# Patient Record
Sex: FEMALE | Race: BLACK OR AFRICAN AMERICAN | NOT HISPANIC OR LATINO
[De-identification: names, ages, dates, MRNs, and addresses within clinical notes are randomized per-mention and may not be internally consistent; named-entity substitution may affect disease eponyms.]

---

## 2020-11-14 ENCOUNTER — TRANSCRIPTION ENCOUNTER (OUTPATIENT)
Age: 35
End: 2020-11-14

## 2022-02-13 ENCOUNTER — FORM ENCOUNTER (OUTPATIENT)
Age: 37
End: 2022-02-13

## 2022-02-21 ENCOUNTER — FORM ENCOUNTER (OUTPATIENT)
Age: 37
End: 2022-02-21

## 2022-10-28 ENCOUNTER — NON-APPOINTMENT (OUTPATIENT)
Age: 37
End: 2022-10-28

## 2022-12-17 ENCOUNTER — OUTPATIENT (OUTPATIENT)
Dept: OUTPATIENT SERVICES | Facility: HOSPITAL | Age: 37
LOS: 1 days | End: 2022-12-17
Payer: COMMERCIAL

## 2022-12-17 ENCOUNTER — RESULT REVIEW (OUTPATIENT)
Age: 37
End: 2022-12-17

## 2022-12-17 ENCOUNTER — APPOINTMENT (OUTPATIENT)
Dept: ULTRASOUND IMAGING | Facility: IMAGING CENTER | Age: 37
End: 2022-12-17

## 2022-12-17 ENCOUNTER — APPOINTMENT (OUTPATIENT)
Dept: MAMMOGRAPHY | Facility: IMAGING CENTER | Age: 37
End: 2022-12-17

## 2022-12-17 ENCOUNTER — APPOINTMENT (OUTPATIENT)
Dept: OBGYN | Facility: HOSPITAL | Age: 37
End: 2022-12-17

## 2022-12-17 ENCOUNTER — OUTPATIENT (OUTPATIENT)
Dept: OUTPATIENT SERVICES | Facility: HOSPITAL | Age: 37
LOS: 1 days | End: 2022-12-17

## 2022-12-17 DIAGNOSIS — Z00.8 ENCOUNTER FOR OTHER GENERAL EXAMINATION: ICD-10-CM

## 2022-12-17 DIAGNOSIS — Z12.39 ENCOUNTER FOR OTHER SCREENING FOR MALIGNANT NEOPLASM OF BREAST: ICD-10-CM

## 2022-12-17 PROCEDURE — 76641 ULTRASOUND BREAST COMPLETE: CPT | Mod: 26,50

## 2022-12-17 PROCEDURE — 77066 DX MAMMO INCL CAD BI: CPT | Mod: 26

## 2022-12-17 PROCEDURE — G0279: CPT | Mod: 26

## 2022-12-17 PROCEDURE — 77066 DX MAMMO INCL CAD BI: CPT

## 2022-12-17 PROCEDURE — 76641 ULTRASOUND BREAST COMPLETE: CPT

## 2022-12-17 PROCEDURE — G0279: CPT

## 2022-12-28 ENCOUNTER — APPOINTMENT (OUTPATIENT)
Dept: ENDOCRINOLOGY | Facility: CLINIC | Age: 37
End: 2022-12-28
Payer: SELF-PAY

## 2022-12-28 VITALS
SYSTOLIC BLOOD PRESSURE: 110 MMHG | WEIGHT: 158 LBS | OXYGEN SATURATION: 99 % | DIASTOLIC BLOOD PRESSURE: 74 MMHG | HEIGHT: 65 IN | BODY MASS INDEX: 26.33 KG/M2 | HEART RATE: 122 BPM

## 2022-12-28 PROCEDURE — 99204 OFFICE O/P NEW MOD 45 MIN: CPT

## 2023-01-02 NOTE — PHYSICAL EXAM
[Alert] : alert [Healthy Appearance] : healthy appearance [No Acute Distress] : no acute distress [Well Developed] : well developed [Normal Sclera/Conjunctiva] : normal sclera/conjunctiva [EOMI] : extra ocular movement intact [Visual Fields Grossly Intact] : visual fields grossly intact [No Neck Mass] : no neck mass was observed [Thyroid Not Enlarged] : the thyroid was not enlarged [No Respiratory Distress] : no respiratory distress [No Accessory Muscle Use] : no accessory muscle use [Clear to Auscultation] : lungs were clear to auscultation bilaterally [Normal S1, S2] : normal S1 and S2 [Normal Rate] : heart rate was normal [Regular Rhythm] : with a regular rhythm [No Edema] : no peripheral edema [Not Tender] : non-tender [Not Distended] : not distended [Soft] : abdomen soft [No Involuntary Movements] : no involuntary movements were seen [No Tremors] : no tremors [Oriented x3] : oriented to person, place, and time [Normal Affect] : the affect was normal [Abdominal Striae] : no abdominal striae [de-identified] : right breast + cloudy discharge from nipple

## 2023-01-02 NOTE — HISTORY OF PRESENT ILLNESS
[FreeTextEntry1] : HISTORY OF PRESENTING ILLNESS:\par The patient is a 37 year old female being seen in the office today for galactorrhea and irregular menses. Patient denies medical problems and denies taking any current medications or supplements. \par \par CHIEF COMPLAINT: galactorrhea and irregular menses\par \par REFERRED BY: her employer referred her to endocrine clinic \par \par Patient reports having irregular menses for 2.5 years. Reports having a few months were she does not have menstrual cycle. Reports spotting in between menstrual cycle.  Last menstrual cycle 11/22/2022 and prior to that was 10/4/2022. But has been having occasional spotting. OBGYN: Dr. Dillon Shahid MD. 2/2022 Pap-smear which was normal per patient. Reports having nipple discharge for one year. Reports cloudy discharge from nipple upon palpation of breast. New pain on right side of breast for about one month. Saw another GYN and had breast US & mammogram done 12/14/2022, does not have results yet. Had blood work done with Dr. Shahid 10/2022 which showed elevated prolactin level, believes prolactin level was 128 at that time. Denies headaches, dizziness, visual field changes, increased facial hair or acne. \par \par \par \par

## 2023-01-02 NOTE — REVIEW OF SYSTEMS
[Irregular Menses] : irregular menses [Galactorrhea] : galactorrhea  [Recent Weight Gain (___ Lbs)] : no recent weight gain [Recent Weight Loss (___ Lbs)] : no recent weight loss [Visual Field Defect] : no visual field defect [Eye Pain] : no pain [Blurred Vision] : no blurred vision [Dysphagia] : no dysphagia [Neck Pain] : no neck pain [Dysphonia] : no dysphonia [Chest Pain] : no chest pain [Palpitations] : no palpitations [Shortness Of Breath] : no shortness of breath [Nausea] : no nausea [Constipation] : no constipation [Vomiting] : no vomiting [Diarrhea] : no diarrhea [Polyuria] : no polyuria [Acne] : no acne [Dry Skin] : no dry skin [Hirsutism] : no hirsutism [Hair Loss] : no hair loss [Headaches] : no headaches [Dizziness] : no dizziness [Difficulty Walking] : no difficulty walking [Tremors] : no tremors [Depression] : no depression [Polydipsia] : no polydipsia [Cold Intolerance] : no cold intolerance [Heat Intolerance] : no heat intolerance [Hot Flashes] : no hot flashes

## 2023-01-02 NOTE — ASSESSMENT
[FreeTextEntry1] : 37 year old female being seen in the office today for galactorrhea and irregular menses.\par - reports having irregular menses for 2.5 years. Reports having nipple discharge for one year. \par - Had blood work done with Dr. Shahid 10/2022 which showed elevated prolactin level, believes prolactin level was 128 at that time.\par - Will work up for pituitary adenoma. However, patient does not have insurance and is paying out of pocket for all appointments/testing. Will avoid unnecessary testing at this time given lack on insurance/cost. \par - Recommend MRI pituitary w/ and w/o contrast.  to provide patient with info for [facility] imaging center billing department.  \par - Likely will need to start Cabergoline pending MRI results. \par \par \par RTC in 3 months \par \par

## 2023-01-06 ENCOUNTER — NON-APPOINTMENT (OUTPATIENT)
Age: 38
End: 2023-01-06

## 2023-01-07 ENCOUNTER — OUTPATIENT (OUTPATIENT)
Dept: OUTPATIENT SERVICES | Facility: HOSPITAL | Age: 38
LOS: 1 days | End: 2023-01-07
Payer: COMMERCIAL

## 2023-01-07 ENCOUNTER — APPOINTMENT (OUTPATIENT)
Dept: MRI IMAGING | Facility: IMAGING CENTER | Age: 38
End: 2023-01-07

## 2023-01-07 DIAGNOSIS — N64.3 GALACTORRHEA NOT ASSOCIATED WITH CHILDBIRTH: ICD-10-CM

## 2023-01-07 DIAGNOSIS — Z91.89 OTHER SPECIFIED PERSONAL RISK FACTORS, NOT ELSEWHERE CLASSIFIED: ICD-10-CM

## 2023-01-07 DIAGNOSIS — Z00.8 ENCOUNTER FOR OTHER GENERAL EXAMINATION: ICD-10-CM

## 2023-01-07 DIAGNOSIS — Z01.419 ENCOUNTER FOR GYNECOLOGICAL EXAMINATION (GENERAL) (ROUTINE) WITHOUT ABNORMAL FINDINGS: ICD-10-CM

## 2023-01-07 PROCEDURE — A9585: CPT

## 2023-01-07 PROCEDURE — 70553 MRI BRAIN STEM W/O & W/DYE: CPT | Mod: 26

## 2023-01-07 PROCEDURE — 70553 MRI BRAIN STEM W/O & W/DYE: CPT

## 2023-01-13 ENCOUNTER — NON-APPOINTMENT (OUTPATIENT)
Age: 38
End: 2023-01-13

## 2023-02-02 ENCOUNTER — TRANSCRIPTION ENCOUNTER (OUTPATIENT)
Age: 38
End: 2023-02-02

## 2023-02-06 ENCOUNTER — NON-APPOINTMENT (OUTPATIENT)
Age: 38
End: 2023-02-06

## 2023-02-08 ENCOUNTER — APPOINTMENT (OUTPATIENT)
Dept: SURGICAL ONCOLOGY | Facility: CLINIC | Age: 38
End: 2023-02-08
Payer: COMMERCIAL

## 2023-02-16 ENCOUNTER — OUTPATIENT (OUTPATIENT)
Dept: OUTPATIENT SERVICES | Facility: HOSPITAL | Age: 38
LOS: 1 days | End: 2023-02-16

## 2023-02-16 ENCOUNTER — APPOINTMENT (OUTPATIENT)
Dept: INTERNAL MEDICINE | Facility: CLINIC | Age: 38
End: 2023-02-16
Payer: COMMERCIAL

## 2023-02-16 VITALS
BODY MASS INDEX: 26.16 KG/M2 | WEIGHT: 157 LBS | HEART RATE: 91 BPM | DIASTOLIC BLOOD PRESSURE: 70 MMHG | HEIGHT: 65 IN | OXYGEN SATURATION: 99 % | SYSTOLIC BLOOD PRESSURE: 116 MMHG

## 2023-02-16 DIAGNOSIS — Z80.1 FAMILY HISTORY OF MALIGNANT NEOPLASM OF TRACHEA, BRONCHUS AND LUNG: ICD-10-CM

## 2023-02-16 DIAGNOSIS — Z78.9 OTHER SPECIFIED HEALTH STATUS: ICD-10-CM

## 2023-02-16 DIAGNOSIS — Z80.3 FAMILY HISTORY OF MALIGNANT NEOPLASM OF BREAST: ICD-10-CM

## 2023-02-16 DIAGNOSIS — M54.50 LOW BACK PAIN, UNSPECIFIED: ICD-10-CM

## 2023-02-16 PROCEDURE — ZZZZZ: CPT | Mod: GC

## 2023-02-16 NOTE — REVIEW OF SYSTEMS
[Abdominal Pain] : abdominal pain [Constipation] : constipation [Back Pain] : back pain [Negative] : Heme/Lymph [FreeTextEntry1] : Burning of R breast

## 2023-02-16 NOTE — PHYSICAL EXAM
[No Acute Distress] : no acute distress [Well Nourished] : well nourished [Well Developed] : well developed [Well-Appearing] : well-appearing [Normal Sclera/Conjunctiva] : normal sclera/conjunctiva [PERRL] : pupils equal round and reactive to light [EOMI] : extraocular movements intact [Normal Outer Ear/Nose] : the outer ears and nose were normal in appearance [Normal Oropharynx] : the oropharynx was normal [No JVD] : no jugular venous distention [No Lymphadenopathy] : no lymphadenopathy [Supple] : supple [Thyroid Normal, No Nodules] : the thyroid was normal and there were no nodules present [No Respiratory Distress] : no respiratory distress  [No Accessory Muscle Use] : no accessory muscle use [Clear to Auscultation] : lungs were clear to auscultation bilaterally [Normal Rate] : normal rate  [Regular Rhythm] : with a regular rhythm [Normal S1, S2] : normal S1 and S2 [No Murmur] : no murmur heard [No Carotid Bruits] : no carotid bruits [No Abdominal Bruit] : a ~M bruit was not heard ~T in the abdomen [No Varicosities] : no varicosities [Pedal Pulses Present] : the pedal pulses are present [No Edema] : there was no peripheral edema [No Palpable Aorta] : no palpable aorta [No Extremity Clubbing/Cyanosis] : no extremity clubbing/cyanosis [Normal Appearance] : normal in appearance [No Nipple Discharge] : no nipple discharge [Soft] : abdomen soft [Non-distended] : non-distended [No Masses] : no abdominal mass palpated [No HSM] : no HSM [Normal Bowel Sounds] : normal bowel sounds [Normal Posterior Cervical Nodes] : no posterior cervical lymphadenopathy [Normal Anterior Cervical Nodes] : no anterior cervical lymphadenopathy [No Spinal Tenderness] : no spinal tenderness [No Joint Swelling] : no joint swelling [Grossly Normal Strength/Tone] : grossly normal strength/tone [No Rash] : no rash [Coordination Grossly Intact] : coordination grossly intact [No Focal Deficits] : no focal deficits [Normal Gait] : normal gait [Deep Tendon Reflexes (DTR)] : deep tendon reflexes were 2+ and symmetric [Normal Affect] : the affect was normal [Normal Insight/Judgement] : insight and judgment were intact [de-identified] : Tenderness to palpation of RLQ [de-identified] : Mild R CVA tenderness and tenderness of mid and lower R back

## 2023-02-16 NOTE — PLAN
[FreeTextEntry1] : 37 year old F with PMHx of hyperprolactinemia (on cabergoline since 1/23) being seen in the office today for new patient visit and complaint of persistent R breast burning and R sided back and abdominal pain.\par \par 37 year old F with PMHx of hyperprolactinemia (on cabergoline since 1/23) being seen in the office today for new patient visit and complaint of persistent R breast burning and R sided back and abdominal pain.\par \par #Hyperprolactinemia\par Prolactin of 128.6 (10/22) identified by her gyn, saw endo in 12/28/22 for complaint of galactorrhea (1 year) and irregular menses (2.5 years), had negative MRI for adenoma. Negative mammogram and US of breasts (12/22). She has been on cabergoline 0.5 x2 a week since 1/23. Likely microadenoma, unidentified on MRI. Galactorrhea resolved, irregular menses/spotting much improved, persistent burning of R breast.\par -c/w cabergoline\par -will repeat prolactin today (will notify patient and she will make endo appt after)\par \par #Persistent burning of R breast\par Pt feels like there is something wrong with her R breast as it feels dense and continues to burn. The burning pain alternates between superiorly over the nipple and within the nipple. Mammogram and US negative 12/22.\par -reassured pt regarding normal imaging tests\par -pt will be seeing gyn on 3/2/23\par \par #Back and abdominal pain\par Pt is complaining of R sided back pain since 1/23, which radiates to her R hip. 7/10 pain, present most of the time. Two weeks ago, pt additionally developed pain of the R abdomen. She stated it is worse with urination. Mild tenderness to palpation of back. Back pain likely musculoskeletal rather than kidney stone. For abdominal pain, likely related to constipation. Negative Argueta's sign. No dysuria, fever or chills. \par -urinalysis w/ microscopic eval\par -encouraged to drink water\par -will investigate whether there is interaction with tylenol/NSAIDs with cabergoline\par -abdominal US\par -FIT test\par -encouraged to take metamucil to treat constipation (see below)\par -advised if back/abdominal pain worsens to call our clinic \par \par #Constipation\par Pt reports hx of constipation since she was a child but recently has gotten worse. In 12/23/22, she woke up to a loud growling noise of her stomach. Since then she has been going more frequently than she is used to, but the stool is small. She occasionally takes "smooth tea" for her constipation. Denied blood in stool.\par -FIT test\par -encouraged to take metamucil\par -encouraged to stay away from red meat and incorporate more vegetables into diet\par \par #HCM\par Vaccines:\par -COVID: x2, encouraged pt to get booster\par -Flu: pt would like to defer today\par -TDAP:  pt would like to defer today\par \par Screening:\par -PHQ2: negative\par -Lipids, A1c: check today\par \par Female Specific:\par -Last Pap Smear: 2/14/22, has gyn appt 3/2/23\par -Mammogram: 12/22- negative\par \par Kaylene Gliagias, PGY-1\par Case Discussed with Dr. Aguilar\par [facility]\par \par RTC in 5 weeks\par \par \par

## 2023-02-16 NOTE — HISTORY OF PRESENT ILLNESS
[FreeTextEntry1] : 37 year old F with PMHx of hyperprolactinemia (on cabergoline since 1/23) being seen in the office today for new patient visit and complaint of persistent R breast burning and R sided back and abdominal pain.\par \par  [de-identified] : 37 year old F with PMHx of hyperprolactinemia (on cabergoline since 1/23) being seen in the office today for new patient visit and complaint of persistent R breast burning and R sided back and abdominal pain.\par \par #Hyperprolactinemia: Pt found to have hyperprolactinemia (10/22) of 128.6, saw endo in 12/28/22 for complaint of galactorrhea (1 year) and irregular menses (2.5 years), had negative MRI for adenoma. Negative mammogram and US of breasts (12/22). She has been on cabergoline 0.5 x2 a week since 1/23.\par \par #Persistent burning of R breast: Pt stated that even though galactorrhea has resolved, she still feels persistent burning of her R breast. The pain which she initially felt since November, when all of her sx started, has resolved. However, she feels like there is something wrong with her R breast as it feels dense and continues to burn. The burning pain alternates between superiorly over the nipple and within the nipple.\par \par #Back and abdominal pain: Pt is complaining of R sided back pain since 1/23, which radiates to her R hip. The pain is 7/10 and is present most of the time. Two weeks ago, pt additionally developed pain of the R abdomen. She stated it is worse with urination. Pt has not taken any medication for the pain because she is worried there is an interaction with cabergoline.\par \par #Constipation: Pt also reports hx of constipation since she was a child but recently has gotten worse. In 12/23/22, she woke up to a loud growling noise of her stomach. Since then she has been going more frequently than she is used to, but the stool is small. She occasionally takes "smooth tea" for her constipation. Denied blood in stool.\par \par Denied fever, chills, dysuria, chest pain, SOB, leg swelling.

## 2023-02-17 ENCOUNTER — NON-APPOINTMENT (OUTPATIENT)
Age: 38
End: 2023-02-17

## 2023-02-17 DIAGNOSIS — K59.00 CONSTIPATION, UNSPECIFIED: ICD-10-CM

## 2023-02-17 DIAGNOSIS — E22.1 HYPERPROLACTINEMIA: ICD-10-CM

## 2023-02-17 LAB
ALBUMIN SERPL ELPH-MCNC: 4.3 G/DL
ALP BLD-CCNC: 47 U/L
ALT SERPL-CCNC: 10 U/L
ANION GAP SERPL CALC-SCNC: 12 MMOL/L
AST SERPL-CCNC: 14 U/L
BASOPHILS # BLD AUTO: 0.1 K/UL
BASOPHILS NFR BLD AUTO: 1.6 %
BILIRUB SERPL-MCNC: 0.5 MG/DL
BUN SERPL-MCNC: 6 MG/DL
CALCIUM SERPL-MCNC: 9.6 MG/DL
CHLORIDE SERPL-SCNC: 104 MMOL/L
CHOLEST SERPL-MCNC: 131 MG/DL
CO2 SERPL-SCNC: 23 MMOL/L
CREAT SERPL-MCNC: 0.62 MG/DL
EGFR: 118 ML/MIN/1.73M2
EOSINOPHIL # BLD AUTO: 0.34 K/UL
EOSINOPHIL NFR BLD AUTO: 5.3 %
GLUCOSE SERPL-MCNC: 82 MG/DL
HCT VFR BLD CALC: 39.2 %
HDLC SERPL-MCNC: 45 MG/DL
HGB BLD-MCNC: 12.1 G/DL
IMM GRANULOCYTES NFR BLD AUTO: 0 %
LDLC SERPL CALC-MCNC: 74 MG/DL
LYMPHOCYTES # BLD AUTO: 2.63 K/UL
LYMPHOCYTES NFR BLD AUTO: 41 %
MAN DIFF?: NORMAL
MCHC RBC-ENTMCNC: 26.4 PG
MCHC RBC-ENTMCNC: 30.9 GM/DL
MCV RBC AUTO: 85.4 FL
MONOCYTES # BLD AUTO: 0.62 K/UL
MONOCYTES NFR BLD AUTO: 9.7 %
NEUTROPHILS # BLD AUTO: 2.72 K/UL
NEUTROPHILS NFR BLD AUTO: 42.4 %
NONHDLC SERPL-MCNC: 87 MG/DL
PLATELET # BLD AUTO: 317 K/UL
POTASSIUM SERPL-SCNC: 4.6 MMOL/L
PROLACTIN SERPL-MCNC: 7.5 NG/ML
PROT SERPL-MCNC: 7.2 G/DL
RBC # BLD: 4.59 M/UL
RBC # FLD: 14.3 %
SODIUM SERPL-SCNC: 139 MMOL/L
TRIGL SERPL-MCNC: 62 MG/DL
WBC # FLD AUTO: 6.41 K/UL

## 2023-02-21 DIAGNOSIS — Z00.00 ENCOUNTER FOR GENERAL ADULT MEDICAL EXAMINATION WITHOUT ABNORMAL FINDINGS: ICD-10-CM

## 2023-03-08 ENCOUNTER — APPOINTMENT (OUTPATIENT)
Dept: SURGICAL ONCOLOGY | Facility: CLINIC | Age: 38
End: 2023-03-08
Payer: COMMERCIAL

## 2023-03-08 ENCOUNTER — NON-APPOINTMENT (OUTPATIENT)
Age: 38
End: 2023-03-08

## 2023-03-08 VITALS
OXYGEN SATURATION: 99 % | BODY MASS INDEX: 25.99 KG/M2 | DIASTOLIC BLOOD PRESSURE: 83 MMHG | RESPIRATION RATE: 17 BRPM | SYSTOLIC BLOOD PRESSURE: 129 MMHG | HEART RATE: 91 BPM | HEIGHT: 65 IN | WEIGHT: 156 LBS

## 2023-03-08 DIAGNOSIS — N64.3 GALACTORRHEA NOT ASSOCIATED WITH CHILDBIRTH: ICD-10-CM

## 2023-03-08 PROCEDURE — 99244 OFF/OP CNSLTJ NEW/EST MOD 40: CPT

## 2023-03-08 RX ORDER — IBUPROFEN 600 MG/1
600 TABLET, FILM COATED ORAL
Qty: 60 | Refills: 3 | Status: ACTIVE | COMMUNITY
Start: 2023-03-08 | End: 1900-01-01

## 2023-03-08 NOTE — HISTORY OF PRESENT ILLNESS
[de-identified] : Patient is a 38 y/o female who presents an initial consultation. She complains of episode of spontaneous, cloudy white nipple discharge from both breasts for about one year with right breast pain and prior rash.  She reports having high prolactin levels in 10/2022 for which she was started on Cabergoline for galactorrhea as per Dr. Ventura of endocrinology. Her recent Prolactin level from 2/2023 was normal. \par \par Patient underwent bilateral mammo/sono on 12/17/22 which showed no mammographic or sonographic evidence of malignancy. Clinical follow-up is recommended for the bilateral nipple discharge. (BI-RADS 2)\par \par Brain MRI (1/07/23): MR pituitary: Unremarkable MR the pituitary.  No pituitary adenoma recognized. MRI brain: There are multiple scattered tiny foci of T2/FLAIR hyperintense signal within the subcortical white matter of the bilateral cerebri, nonspecific in a patient of this age, a reflect early onset chronic microvascular ischemic change, sequela of migraine headache, versus other white matter etiologies, and clinical correlation is needed.\par \par The patient has the following family history of breast cancer in her female cousin.\par Pt denies use of birth control pills.

## 2023-03-08 NOTE — ASSESSMENT
[FreeTextEntry1] : Minimal discharge significantly improved with Cabergoline \par Reassured patient that index of suspicion for malignancy is low in light of BI-RADS 2 imaging December 2022\par Right breast pain likely combination of fibrocystic changes and musculoskeletal pain\par Will get breast MRI given unusual clinical representation \par Recommend 600mg of Motrin po q6 hours x 48 hours then q6 hours prn for musculoskeletal pain\par Recommend vitamin e and/or primrose prn for fibrocystic breast pain\par Continue follow up with endocrinology for management of hyperprolactinemia in setting of negative brain MRI \par RTO 6 weeks for exam \par All questions answered

## 2023-03-08 NOTE — CONSULT LETTER
[Dear  ___] : Dear  [unfilled], [Consult Letter:] : I had the pleasure of evaluating your patient, [unfilled]. [Please see my note below.] : Please see my note below. [Sincerely,] : Sincerely, [FreeTextEntry3] : Kameron Cano MD FACS\par

## 2023-03-08 NOTE — ADDENDUM
[FreeTextEntry1] : I, Cherelle Ramon, acted solely as a scribe for Dr. Kameron Cano on this date 03/08/2023.

## 2023-03-12 ENCOUNTER — OUTPATIENT (OUTPATIENT)
Dept: OUTPATIENT SERVICES | Facility: HOSPITAL | Age: 38
LOS: 1 days | End: 2023-03-12
Payer: COMMERCIAL

## 2023-03-12 ENCOUNTER — APPOINTMENT (OUTPATIENT)
Dept: ULTRASOUND IMAGING | Facility: IMAGING CENTER | Age: 38
End: 2023-03-12
Payer: COMMERCIAL

## 2023-03-12 DIAGNOSIS — R10.9 UNSPECIFIED ABDOMINAL PAIN: ICD-10-CM

## 2023-03-12 DIAGNOSIS — Z00.8 ENCOUNTER FOR OTHER GENERAL EXAMINATION: ICD-10-CM

## 2023-03-12 PROCEDURE — 76700 US EXAM ABDOM COMPLETE: CPT

## 2023-03-12 PROCEDURE — 76700 US EXAM ABDOM COMPLETE: CPT | Mod: 26

## 2023-03-13 ENCOUNTER — OUTPATIENT (OUTPATIENT)
Dept: OUTPATIENT SERVICES | Facility: HOSPITAL | Age: 38
LOS: 1 days | End: 2023-03-13
Payer: COMMERCIAL

## 2023-03-13 ENCOUNTER — APPOINTMENT (OUTPATIENT)
Dept: MRI IMAGING | Facility: IMAGING CENTER | Age: 38
End: 2023-03-13
Payer: COMMERCIAL

## 2023-03-13 ENCOUNTER — NON-APPOINTMENT (OUTPATIENT)
Age: 38
End: 2023-03-13

## 2023-03-13 DIAGNOSIS — N60.19 DIFFUSE CYSTIC MASTOPATHY OF UNSPECIFIED BREAST: ICD-10-CM

## 2023-03-13 LAB — HEMOCCULT STL QL IA: NEGATIVE

## 2023-03-13 PROCEDURE — A9585: CPT

## 2023-03-13 PROCEDURE — C8908: CPT

## 2023-03-13 PROCEDURE — C8937: CPT

## 2023-03-13 PROCEDURE — 77049 MRI BREAST C-+ W/CAD BI: CPT | Mod: 26

## 2023-03-15 ENCOUNTER — NON-APPOINTMENT (OUTPATIENT)
Age: 38
End: 2023-03-15

## 2023-04-04 ENCOUNTER — OUTPATIENT (OUTPATIENT)
Dept: OUTPATIENT SERVICES | Facility: HOSPITAL | Age: 38
LOS: 1 days | End: 2023-04-04

## 2023-04-04 ENCOUNTER — RESULT REVIEW (OUTPATIENT)
Age: 38
End: 2023-04-04

## 2023-04-04 ENCOUNTER — APPOINTMENT (OUTPATIENT)
Dept: OBGYN | Facility: HOSPITAL | Age: 38
End: 2023-04-04

## 2023-04-04 VITALS
WEIGHT: 151 LBS | BODY MASS INDEX: 25.16 KG/M2 | SYSTOLIC BLOOD PRESSURE: 101 MMHG | HEIGHT: 65 IN | HEART RATE: 88 BPM | TEMPERATURE: 97.2 F | DIASTOLIC BLOOD PRESSURE: 67 MMHG

## 2023-04-04 DIAGNOSIS — E22.1 HYPERPROLACTINEMIA: ICD-10-CM

## 2023-04-04 DIAGNOSIS — Z01.419 ENCOUNTER FOR GYNECOLOGICAL EXAMINATION (GENERAL) (ROUTINE) WITHOUT ABNORMAL FINDINGS: ICD-10-CM

## 2023-04-04 LAB
HBV SURFACE AG SER-ACNC: SIGNIFICANT CHANGE UP
HCV AB S/CO SERPL IA: 0.28 S/CO — SIGNIFICANT CHANGE UP (ref 0–0.99)
HCV AB SERPL-IMP: SIGNIFICANT CHANGE UP
HIV 1+2 AB+HIV1 P24 AG SERPL QL IA: SIGNIFICANT CHANGE UP
T PALLIDUM AB TITR SER: NEGATIVE — SIGNIFICANT CHANGE UP

## 2023-04-04 NOTE — HISTORY OF PRESENT ILLNESS
[Patient reported PAP Smear was normal] : Patient reported PAP Smear was normal [HIV Test offered] : HIV Test offered [Syphilis test offered] : Syphilis test offered [Gonorrhea test offered] : Gonorrhea test offered [Chlamydia test offered] : Chlamydia test offered [Hepatitis B test offered] : Hepatitis B test offered [Hepatitis C test offered] : Hepatitis C test offered [Y] : Patient is sexually active [N] : Patient denies prior pregnancies [TextBox_4] : 36 yo  LMP 3/28/2023 here for initial Gyn exam.  c/o pelvic pain and cramping just prior to periods.  Recently seen by another Gyn provider and diagnosed with hyperprolactinemia and started on Cabergoline.  Had normal pap done and now reports periods are very regular.  Was also have strong breast tenderness and had normal mammo and breast MRI.  Reports breast tenderness in resolving.  Monogamous with one male partner [PapSmeardate] : 02/23 [TextBox_31] : done outside [LMPDate] : 03/28/2023 [MensesFreq] : 28 [Currently Active] : currently active [Men] : men [Vaginal] : vaginal [Oral] : oral [No] : No [Yes] : Yes [Condoms] : Condoms [Patient would like to be screened for STIs] : Patient would like to be screened for STIs

## 2023-04-04 NOTE — COUNSELING
[Nutrition/ Exercise/ Weight Management] : nutrition, exercise, weight management [Breast Self Exam] : breast self exam [Contraception/ Emergency Contraception/ Safe Sexual Practices] : contraception, emergency contraception, safe sexual practices [Lab Results] : lab results

## 2023-04-04 NOTE — DISCUSSION/SUMMARY
[FreeTextEntry1] : Annual Gyn exam\par --recent pap done 2/23\par --GC/CHlam done\par --annual blood work done by PCP\par --STD blood work done today\par --SBE monthly\par --diet and exercise\par \par Hyperprolactinemia\par --continue Cabergoline as prescribed\par --continue to follow prolactin levels with endocrine\par --keep menstrual calendar\par \par Abdominal pain/Dysmenorrhea\par --reports pain just prior to periods\par --no clinical findings on pelvic exam\par --advised NSAID's prn\par \par Family planning\par --birth control options reviewed\par --patient desires condoms only\par Follow up for annual/prn

## 2023-04-05 LAB
C TRACH RRNA SPEC QL NAA+PROBE: SIGNIFICANT CHANGE UP
N GONORRHOEA RRNA SPEC QL NAA+PROBE: SIGNIFICANT CHANGE UP
SPECIMEN SOURCE: SIGNIFICANT CHANGE UP

## 2023-04-18 PROBLEM — N60.19 FIBROCYSTIC BREAST CHANGES: Status: ACTIVE | Noted: 2023-03-08

## 2023-04-18 NOTE — ASSESSMENT
[FreeTextEntry1] : Minimal discharge significantly improved with Cabergoline \par Reassured patient that index of suspicion for malignancy is low in light of BI-RADS 2 imaging December 2022\par Right breast pain likely combination of fibrocystic changes and musculoskeletal pain\par MRI recommended given unusual clinical representation - Breast MRI 3/13/23- BIRADS 2\par Recommend 600mg of Motrin po q6 hours x 48 hours then q6 hours prn for musculoskeletal pain\par Recommend vitamin e and/or primrose prn for fibrocystic breast pain\par Continue follow up with endocrinology for management of hyperprolactinemia in setting of negative brain MRI \par

## 2023-04-18 NOTE — PHYSICAL EXAM
[Normal] : supple, no neck mass and thyroid not enlarged [Normal Neck Lymph Nodes] : normal neck lymph nodes  [Normal Supraclavicular Lymph Nodes] : normal supraclavicular lymph nodes [Normal Axillary Lymph Nodes] : normal axillary lymph nodes [Normal Groin Lymph Nodes] : normal groin lymph nodes [Normal] : oriented to person, place and time, with appropriate affect [de-identified] : no masses or adenopathy bilaterally. scant discharge right breast with aggressive palpitation - GUAIAC negative.  no discharge left breast.

## 2023-04-18 NOTE — REASON FOR VISIT
[Follow-Up Visit] : a follow-up visit for [Nipple Discharge] : nipple discharge [Mastodynia] : mastodynia

## 2023-04-18 NOTE — OB HISTORY
[Definite ___ (Date)] : the last menstrual period was [unfilled] [Menstruating] : The patient is menstruating [Total Preg ___] : G[unfilled] [Live Births ___] : P[unfilled]  [Abortions ___] : Abortions:[unfilled]

## 2023-04-18 NOTE — HISTORY OF PRESENT ILLNESS
[de-identified] : Patient is a 38 y/o female who returns for follow up after an initial consultation on 3/8/23 regarding breast pain and nipple discharge. She complains of episode of spontaneous, cloudy white nipple discharge from both breasts for about one year with right breast pain and prior rash.  She reports having high prolactin levels in 10/2022 for which she was started on Cabergoline for galactorrhea as per Dr. Ventura of endocrinology. Her recent Prolactin level from 2/2023 was normal. \par \par Breast MRI 3/13/23- BIRADS 2\par \par Patient underwent bilateral mammo/sono on 12/17/22 which showed no mammographic or sonographic evidence of malignancy. Clinical follow-up is recommended for the bilateral nipple discharge. (BI-RADS 2)\par \par Brain MRI (1/07/23): MR pituitary: Unremarkable MR the pituitary.  No pituitary adenoma recognized. MRI brain: There are multiple scattered tiny foci of T2/FLAIR hyperintense signal within the subcortical white matter of the bilateral cerebri, nonspecific in a patient of this age, a reflect early onset chronic microvascular ischemic change, sequela of migraine headache, versus other white matter etiologies, and clinical correlation is needed.\par \par The patient has the following family history of breast cancer in her female cousin.\par Pt denies use of birth control pills.

## 2023-04-19 ENCOUNTER — APPOINTMENT (OUTPATIENT)
Dept: SURGICAL ONCOLOGY | Facility: CLINIC | Age: 38
End: 2023-04-19

## 2023-04-19 DIAGNOSIS — N60.19 DIFFUSE CYSTIC MASTOPATHY OF UNSPECIFIED BREAST: ICD-10-CM

## 2023-08-07 ENCOUNTER — APPOINTMENT (OUTPATIENT)
Dept: INTERNAL MEDICINE | Facility: CLINIC | Age: 38
End: 2023-08-07
Payer: COMMERCIAL

## 2023-08-07 ENCOUNTER — OUTPATIENT (OUTPATIENT)
Dept: OUTPATIENT SERVICES | Facility: HOSPITAL | Age: 38
LOS: 1 days | End: 2023-08-07

## 2023-08-07 VITALS
DIASTOLIC BLOOD PRESSURE: 60 MMHG | BODY MASS INDEX: 24.99 KG/M2 | SYSTOLIC BLOOD PRESSURE: 100 MMHG | HEIGHT: 65 IN | WEIGHT: 150 LBS

## 2023-08-07 DIAGNOSIS — K59.00 CONSTIPATION, UNSPECIFIED: ICD-10-CM

## 2023-08-07 DIAGNOSIS — E22.1 HYPERPROLACTINEMIA: ICD-10-CM

## 2023-08-07 PROCEDURE — ZZZZZ: CPT

## 2023-08-07 RX ORDER — POLYETHYLENE GLYCOL 3350 17 G/17G
17 POWDER, FOR SOLUTION ORAL DAILY
Qty: 150 | Refills: 0 | Status: ACTIVE | COMMUNITY
Start: 2023-08-07 | End: 1900-01-01

## 2023-08-07 NOTE — PHYSICAL EXAM
[No Acute Distress] : no acute distress [Well Nourished] : well nourished [Well Developed] : well developed [Well-Appearing] : well-appearing [Normal Sclera/Conjunctiva] : normal sclera/conjunctiva [Normal Outer Ear/Nose] : the outer ears and nose were normal in appearance [Normal Oropharynx] : the oropharynx was normal [No JVD] : no jugular venous distention [No Lymphadenopathy] : no lymphadenopathy [Supple] : supple [Thyroid Normal, No Nodules] : the thyroid was normal and there were no nodules present [No Respiratory Distress] : no respiratory distress  [No Accessory Muscle Use] : no accessory muscle use [Clear to Auscultation] : lungs were clear to auscultation bilaterally [Normal Rate] : normal rate  [Regular Rhythm] : with a regular rhythm [Normal S1, S2] : normal S1 and S2 [No Murmur] : no murmur heard [No Carotid Bruits] : no carotid bruits [No Abdominal Bruit] : a ~M bruit was not heard ~T in the abdomen [No Varicosities] : no varicosities [Pedal Pulses Present] : the pedal pulses are present [No Edema] : there was no peripheral edema [No Palpable Aorta] : no palpable aorta [No Extremity Clubbing/Cyanosis] : no extremity clubbing/cyanosis [Soft] : abdomen soft [Non Tender] : non-tender [Non-distended] : non-distended [No Masses] : no abdominal mass palpated [No HSM] : no HSM [Normal Bowel Sounds] : normal bowel sounds [No Joint Swelling] : no joint swelling [Grossly Normal Strength/Tone] : grossly normal strength/tone [No Rash] : no rash [No Focal Deficits] : no focal deficits [Normal Affect] : the affect was normal [Normal Insight/Judgement] : insight and judgment were intact [de-identified] : Negative Argueta's sign.

## 2023-08-07 NOTE — HISTORY OF PRESENT ILLNESS
[Constipation] : constipation [FreeTextEntry8] : 38 year old F with PMHx of hyperprolactinemia (on cabergoline since 1/23) being seen in the office today for abdominal discomfort.   #Abdominal discomfort: Pt stated she has had constipation since her teens but since her last visit and being started on metamucil, she developed some lower abdominal discomfort which prompted discontinuation of metamucil. Pt's stool then became smaller and harder and since a month or two ago, changed shape to more rectangular than round. Pt stated she has bowel movements once a day. Her diet consists of occasional pizza, fried chicken, some broccoli, rice. Denied recent weight loss or blood in the stool or when wiping.   Pt is still taking cabergoline as prescribed. She stated she was told to take this medicine for one year. She stated she has occasional dizziness which she attributes to the cabergoline.

## 2023-08-07 NOTE — REVIEW OF SYSTEMS
[Constipation] : constipation [Negative] : Heme/Lymph [FreeTextEntry7] : abdominal discomfort [de-identified] : occasional dizziness

## 2023-08-07 NOTE — PLAN
[FreeTextEntry1] : 38 year old F with PMHx of hyperprolactinemia (on cabergoline since 1/23) being seen in the office today for abdominal discomfort.   #Abdominal discomfort Pt w/lifelong constipation, was started on metamucil last visit but stopped, with worsening constipation. Pt has poor diet. No abdominal tenderness on exam, no blood in stool or weight loss. No fam hx of colon cancer. -FIT test 3/2023 negative -CBC/CMP from 2/2023 unremarkable -Abdominal ultrasound 3/3023 showed 14 mm hemangioma -TSH check today -start miralax daily for consistent bowel movements -encouraged pt to add more fiber and water to diet  -rxed GI referral   #Hyperprolactinemia Pt on cabergoline since 1/2023, prolactin normal range in 2/2023. -c/w cabergoline -will re-check prolactin levels today -encouraged pt to follow up with endocrinology at next available appt  #HCM  Vaccines:  -COVID: x2, encouraged pt to get booster  -Flu: revisit in fall  -TDAP: revisit at next visit  Screening:  -PHQ2: negative  -Lipids, A1c: A1c borderline 5.6, HDL low  Female Specific:  -Last Pap Smear: 2/2023  -Mammogram: 12/22- negative   Vasiliki Gliagias, PGY-2  Case Discussed with Dr. Cobos  [facility]  RTC in 3 months after pt sees endocrinologist, can return sooner if needed

## 2023-08-08 LAB
PROLACTIN SERPL-MCNC: 6.7 NG/ML
TSH SERPL-ACNC: 1.46 UIU/ML

## 2023-08-25 LAB
APPEARANCE: CLEAR
BACTERIA: NEGATIVE
BILIRUBIN URINE: NEGATIVE
BLOOD URINE: NEGATIVE
COLOR: NORMAL
GLUCOSE QUALITATIVE U: NEGATIVE
HYALINE CASTS: 2 /LPF
KETONES URINE: NEGATIVE
LEUKOCYTE ESTERASE URINE: NEGATIVE
MICROSCOPIC-UA: NORMAL
NITRITE URINE: NEGATIVE
PH URINE: 6.5
PROTEIN URINE: NEGATIVE
RED BLOOD CELLS URINE: 2 /HPF
SPECIFIC GRAVITY URINE: 1.01
SQUAMOUS EPITHELIAL CELLS: 2 /HPF
UROBILINOGEN URINE: NORMAL
WHITE BLOOD CELLS URINE: 0 /HPF

## 2023-09-14 ENCOUNTER — OUTPATIENT (OUTPATIENT)
Dept: OUTPATIENT SERVICES | Facility: HOSPITAL | Age: 38
LOS: 1 days | End: 2023-09-14

## 2023-09-14 ENCOUNTER — APPOINTMENT (OUTPATIENT)
Dept: OBGYN | Facility: HOSPITAL | Age: 38
End: 2023-09-14

## 2023-09-14 DIAGNOSIS — Z00.00 ENCOUNTER FOR GENERAL ADULT MEDICAL EXAMINATION W/OUT ABNORMAL FINDINGS: ICD-10-CM

## 2023-09-14 LAB
HCG UR QL: POSITIVE
QUALITY CONTROL: YES

## 2023-09-18 DIAGNOSIS — Z32.00 ENCOUNTER FOR PREGNANCY TEST, RESULT UNKNOWN: ICD-10-CM

## 2023-09-29 ENCOUNTER — RESULT REVIEW (OUTPATIENT)
Age: 38
End: 2023-09-29

## 2023-09-29 ENCOUNTER — OUTPATIENT (OUTPATIENT)
Dept: OUTPATIENT SERVICES | Facility: HOSPITAL | Age: 38
LOS: 1 days | End: 2023-09-29

## 2023-09-29 ENCOUNTER — APPOINTMENT (OUTPATIENT)
Dept: OBGYN | Facility: HOSPITAL | Age: 38
End: 2023-09-29

## 2023-09-29 VITALS
HEART RATE: 76 BPM | DIASTOLIC BLOOD PRESSURE: 67 MMHG | TEMPERATURE: 98 F | SYSTOLIC BLOOD PRESSURE: 104 MMHG | HEIGHT: 65 IN | WEIGHT: 141 LBS | BODY MASS INDEX: 23.49 KG/M2

## 2023-09-29 DIAGNOSIS — E22.1 HYPERPROLACTINEMIA: ICD-10-CM

## 2023-09-29 DIAGNOSIS — Z78.9 OTHER SPECIFIED HEALTH STATUS: ICD-10-CM

## 2023-09-29 LAB
24R-OH-CALCIDIOL SERPL-MCNC: 12.8 NG/ML — LOW (ref 30–80)
ALBUMIN SERPL ELPH-MCNC: 4.5 G/DL — SIGNIFICANT CHANGE UP (ref 3.3–5)
ALP SERPL-CCNC: 31 U/L — LOW (ref 40–120)
ALT FLD-CCNC: 8 U/L — SIGNIFICANT CHANGE UP (ref 4–33)
ANION GAP SERPL CALC-SCNC: 11 MMOL/L — SIGNIFICANT CHANGE UP (ref 7–14)
APPEARANCE UR: CLEAR — SIGNIFICANT CHANGE UP
AST SERPL-CCNC: 11 U/L — SIGNIFICANT CHANGE UP (ref 4–32)
BACTERIA # UR AUTO: NEGATIVE /HPF — SIGNIFICANT CHANGE UP
BILIRUB SERPL-MCNC: 0.5 MG/DL — SIGNIFICANT CHANGE UP (ref 0.2–1.2)
BILIRUB UR-MCNC: NEGATIVE — SIGNIFICANT CHANGE UP
BUN SERPL-MCNC: 5 MG/DL — LOW (ref 7–23)
CALCIUM SERPL-MCNC: 9.1 MG/DL — SIGNIFICANT CHANGE UP (ref 8.4–10.5)
CAST: 0 /LPF — SIGNIFICANT CHANGE UP (ref 0–4)
CHLORIDE SERPL-SCNC: 102 MMOL/L — SIGNIFICANT CHANGE UP (ref 98–107)
CO2 SERPL-SCNC: 21 MMOL/L — LOW (ref 22–31)
COLOR SPEC: YELLOW — SIGNIFICANT CHANGE UP
CREAT SERPL-MCNC: 0.52 MG/DL — SIGNIFICANT CHANGE UP (ref 0.5–1.3)
DIFF PNL FLD: NEGATIVE — SIGNIFICANT CHANGE UP
EGFR: 123 ML/MIN/1.73M2 — SIGNIFICANT CHANGE UP
GLUCOSE SERPL-MCNC: 77 MG/DL — SIGNIFICANT CHANGE UP (ref 70–99)
GLUCOSE UR QL: NEGATIVE MG/DL — SIGNIFICANT CHANGE UP
HBV SURFACE AG SER-ACNC: SIGNIFICANT CHANGE UP
HCT VFR BLD CALC: 36.9 % — SIGNIFICANT CHANGE UP (ref 34.5–45)
HCV AB S/CO SERPL IA: 0.29 S/CO — SIGNIFICANT CHANGE UP (ref 0–0.99)
HCV AB SERPL-IMP: SIGNIFICANT CHANGE UP
HGB BLD-MCNC: 11.7 G/DL — SIGNIFICANT CHANGE UP (ref 11.5–15.5)
HIV 1+2 AB+HIV1 P24 AG SERPL QL IA: SIGNIFICANT CHANGE UP
KETONES UR-MCNC: NEGATIVE MG/DL — SIGNIFICANT CHANGE UP
LEUKOCYTE ESTERASE UR-ACNC: NEGATIVE — SIGNIFICANT CHANGE UP
MCHC RBC-ENTMCNC: 25.5 PG — LOW (ref 27–34)
MCHC RBC-ENTMCNC: 31.7 GM/DL — LOW (ref 32–36)
MCV RBC AUTO: 80.4 FL — SIGNIFICANT CHANGE UP (ref 80–100)
MEV IGG SER-ACNC: 25.2 AU/ML — SIGNIFICANT CHANGE UP
MEV IGG+IGM SER-IMP: POSITIVE — SIGNIFICANT CHANGE UP
NITRITE UR-MCNC: NEGATIVE — SIGNIFICANT CHANGE UP
NRBC # BLD: 0 /100 WBCS — SIGNIFICANT CHANGE UP (ref 0–0)
NRBC # FLD: 0 K/UL — SIGNIFICANT CHANGE UP (ref 0–0)
PH UR: 6 — SIGNIFICANT CHANGE UP (ref 5–8)
PLATELET # BLD AUTO: 267 K/UL — SIGNIFICANT CHANGE UP (ref 150–400)
POTASSIUM SERPL-MCNC: 4.1 MMOL/L — SIGNIFICANT CHANGE UP (ref 3.5–5.3)
POTASSIUM SERPL-SCNC: 4.1 MMOL/L — SIGNIFICANT CHANGE UP (ref 3.5–5.3)
PROT SERPL-MCNC: 7.6 G/DL — SIGNIFICANT CHANGE UP (ref 6–8.3)
PROT UR-MCNC: NEGATIVE MG/DL — SIGNIFICANT CHANGE UP
RBC # BLD: 4.59 M/UL — SIGNIFICANT CHANGE UP (ref 3.8–5.2)
RBC # FLD: 15.1 % — HIGH (ref 10.3–14.5)
RBC CASTS # UR COMP ASSIST: 1 /HPF — SIGNIFICANT CHANGE UP (ref 0–4)
RUBV IGG SER-ACNC: 3.2 INDEX — SIGNIFICANT CHANGE UP
RUBV IGG SER-IMP: POSITIVE — SIGNIFICANT CHANGE UP
SODIUM SERPL-SCNC: 134 MMOL/L — LOW (ref 135–145)
SP GR SPEC: 1.01 — SIGNIFICANT CHANGE UP (ref 1–1.03)
SQUAMOUS # UR AUTO: 0 /HPF — SIGNIFICANT CHANGE UP (ref 0–5)
T PALLIDUM AB TITR SER: NEGATIVE — SIGNIFICANT CHANGE UP
URATE SERPL-MCNC: 2.3 MG/DL — LOW (ref 2.5–7)
UROBILINOGEN FLD QL: 0.2 MG/DL — SIGNIFICANT CHANGE UP (ref 0.2–1)
VZV IGG FLD QL IA: 365.3 INDEX — SIGNIFICANT CHANGE UP
VZV IGG FLD QL IA: POSITIVE — SIGNIFICANT CHANGE UP
WBC # BLD: 7.26 K/UL — SIGNIFICANT CHANGE UP (ref 3.8–10.5)
WBC # FLD AUTO: 7.26 K/UL — SIGNIFICANT CHANGE UP (ref 3.8–10.5)
WBC UR QL: 0 /HPF — SIGNIFICANT CHANGE UP (ref 0–5)

## 2023-09-30 LAB
C TRACH RRNA SPEC QL NAA+PROBE: SIGNIFICANT CHANGE UP
CULTURE RESULTS: SIGNIFICANT CHANGE UP
HPV HIGH+LOW RISK DNA PNL CVX: SIGNIFICANT CHANGE UP
LEAD BLD-MCNC: <1 UG/DL — SIGNIFICANT CHANGE UP (ref 0–3.4)
N GONORRHOEA RRNA SPEC QL NAA+PROBE: SIGNIFICANT CHANGE UP
SPECIMEN SOURCE: SIGNIFICANT CHANGE UP
SPECIMEN SOURCE: SIGNIFICANT CHANGE UP

## 2023-10-01 LAB
GAMMA INTERFERON BACKGROUND BLD IA-ACNC: 0.23 IU/ML — SIGNIFICANT CHANGE UP
M TB IFN-G BLD-IMP: NEGATIVE — SIGNIFICANT CHANGE UP
M TB IFN-G CD4+ BCKGRND COR BLD-ACNC: -0.01 IU/ML — SIGNIFICANT CHANGE UP
M TB IFN-G CD4+CD8+ BCKGRND COR BLD-ACNC: -0.08 IU/ML — SIGNIFICANT CHANGE UP
QUANT TB PLUS MITOGEN MINUS NIL: 8.89 IU/ML — SIGNIFICANT CHANGE UP

## 2023-10-02 DIAGNOSIS — R10.9 UNSPECIFIED ABDOMINAL PAIN: ICD-10-CM

## 2023-10-02 DIAGNOSIS — Z34.91 ENCOUNTER FOR SUPERVISION OF NORMAL PREGNANCY, UNSPECIFIED, FIRST TRIMESTER: ICD-10-CM

## 2023-10-02 DIAGNOSIS — K59.00 CONSTIPATION, UNSPECIFIED: ICD-10-CM

## 2023-10-02 DIAGNOSIS — E22.1 HYPERPROLACTINEMIA: ICD-10-CM

## 2023-10-02 RX ORDER — CHOLECALCIFEROL (VITAMIN D3) 125 MCG
1 CAPSULE ORAL
Qty: 30 | Refills: 6
Start: 2023-10-02 | End: 2024-04-28

## 2023-10-03 LAB — CYTOLOGY SPEC DOC CYTO: SIGNIFICANT CHANGE UP

## 2023-10-09 ENCOUNTER — APPOINTMENT (OUTPATIENT)
Dept: MATERNAL FETAL MEDICINE | Facility: HOSPITAL | Age: 38
End: 2023-10-09
Payer: MEDICAID

## 2023-10-09 ENCOUNTER — ASOB RESULT (OUTPATIENT)
Age: 38
End: 2023-10-09

## 2023-10-09 PROCEDURE — 76813 OB US NUCHAL MEAS 1 GEST: CPT | Mod: 26

## 2023-10-09 PROCEDURE — 76801 OB US < 14 WKS SINGLE FETUS: CPT | Mod: 26

## 2023-10-12 ENCOUNTER — NON-APPOINTMENT (OUTPATIENT)
Age: 38
End: 2023-10-12

## 2023-10-19 ENCOUNTER — NON-APPOINTMENT (OUTPATIENT)
Age: 38
End: 2023-10-19

## 2023-10-20 ENCOUNTER — APPOINTMENT (OUTPATIENT)
Dept: OBGYN | Facility: HOSPITAL | Age: 38
End: 2023-10-20

## 2023-10-20 ENCOUNTER — MED ADMIN CHARGE (OUTPATIENT)
Age: 38
End: 2023-10-20

## 2023-10-20 ENCOUNTER — OUTPATIENT (OUTPATIENT)
Dept: OUTPATIENT SERVICES | Facility: HOSPITAL | Age: 38
LOS: 1 days | End: 2023-10-20

## 2023-10-20 VITALS
TEMPERATURE: 97.7 F | HEIGHT: 65 IN | SYSTOLIC BLOOD PRESSURE: 109 MMHG | BODY MASS INDEX: 23.99 KG/M2 | HEART RATE: 75 BPM | DIASTOLIC BLOOD PRESSURE: 72 MMHG | WEIGHT: 144 LBS

## 2023-10-20 DIAGNOSIS — Z23 ENCOUNTER FOR IMMUNIZATION: ICD-10-CM

## 2023-10-20 DIAGNOSIS — Z01.419 ENCOUNTER FOR GYNECOLOGICAL EXAMINATION (GENERAL) (ROUTINE) W/OUT ABNORMAL FINDINGS: ICD-10-CM

## 2023-10-20 DIAGNOSIS — O09.522 SUPERVISION OF ELDERLY MULTIGRAVIDA, SECOND TRIMESTER: ICD-10-CM

## 2023-10-20 DIAGNOSIS — R10.9 UNSPECIFIED ABDOMINAL PAIN: ICD-10-CM

## 2023-10-20 DIAGNOSIS — E55.9 VITAMIN D DEFICIENCY, UNSPECIFIED: ICD-10-CM

## 2023-10-20 RX ORDER — CABERGOLINE 0.5 MG/1
0.5 TABLET ORAL
Qty: 8 | Refills: 5 | Status: DISCONTINUED | COMMUNITY
Start: 2023-01-08 | End: 2023-10-20

## 2023-10-20 RX ORDER — ASPIRIN 81 MG/1
81 TABLET, COATED ORAL DAILY
Qty: 60 | Refills: 3 | Status: COMPLETED | COMMUNITY
Start: 2023-10-20 | End: 2024-02-17

## 2023-10-23 ENCOUNTER — NON-APPOINTMENT (OUTPATIENT)
Age: 38
End: 2023-10-23

## 2023-11-01 ENCOUNTER — NON-APPOINTMENT (OUTPATIENT)
Age: 38
End: 2023-11-01

## 2023-11-16 ENCOUNTER — OUTPATIENT (OUTPATIENT)
Dept: OUTPATIENT SERVICES | Facility: HOSPITAL | Age: 38
LOS: 1 days | End: 2023-11-16

## 2023-11-16 ENCOUNTER — APPOINTMENT (OUTPATIENT)
Dept: GASTROENTEROLOGY | Facility: CLINIC | Age: 38
End: 2023-11-16
Payer: MEDICAID

## 2023-11-16 VITALS
SYSTOLIC BLOOD PRESSURE: 104 MMHG | BODY MASS INDEX: 24.16 KG/M2 | HEART RATE: 76 BPM | RESPIRATION RATE: 17 BRPM | OXYGEN SATURATION: 99 % | HEIGHT: 65 IN | WEIGHT: 145 LBS | DIASTOLIC BLOOD PRESSURE: 64 MMHG

## 2023-11-16 DIAGNOSIS — K62.5 HEMORRHAGE OF ANUS AND RECTUM: ICD-10-CM

## 2023-11-16 PROCEDURE — ZZZZZ: CPT | Mod: GC

## 2023-11-17 ENCOUNTER — OUTPATIENT (OUTPATIENT)
Dept: OUTPATIENT SERVICES | Facility: HOSPITAL | Age: 38
LOS: 1 days | End: 2023-11-17

## 2023-11-17 ENCOUNTER — APPOINTMENT (OUTPATIENT)
Dept: OBGYN | Facility: HOSPITAL | Age: 38
End: 2023-11-17

## 2023-11-17 ENCOUNTER — RESULT REVIEW (OUTPATIENT)
Age: 38
End: 2023-11-17

## 2023-11-17 VITALS
BODY MASS INDEX: 24.66 KG/M2 | HEART RATE: 84 BPM | DIASTOLIC BLOOD PRESSURE: 67 MMHG | TEMPERATURE: 97.6 F | SYSTOLIC BLOOD PRESSURE: 103 MMHG | WEIGHT: 148 LBS | HEIGHT: 65 IN

## 2023-11-17 DIAGNOSIS — K62.5 HEMORRHAGE OF ANUS AND RECTUM: ICD-10-CM

## 2023-11-17 DIAGNOSIS — K59.00 CONSTIPATION, UNSPECIFIED: ICD-10-CM

## 2023-11-20 DIAGNOSIS — E55.9 VITAMIN D DEFICIENCY, UNSPECIFIED: ICD-10-CM

## 2023-11-20 DIAGNOSIS — O09.522 SUPERVISION OF ELDERLY MULTIGRAVIDA, SECOND TRIMESTER: ICD-10-CM

## 2023-11-20 DIAGNOSIS — K59.00 CONSTIPATION, UNSPECIFIED: ICD-10-CM

## 2023-11-24 LAB
AFP ADJ MOM SERPL: 1 — SIGNIFICANT CHANGE UP
AFP ADJ MOM SERPL: 1 — SIGNIFICANT CHANGE UP
AFP INTERP SERPL-IMP: SIGNIFICANT CHANGE UP
AFP SERPL-MCNC: 53.4 NG/ML — SIGNIFICANT CHANGE UP
AFP SERPL-MCNC: 53.4 NG/ML — SIGNIFICANT CHANGE UP
AGE AT DELIVERY: 37.8 YR — SIGNIFICANT CHANGE UP
AGE AT DELIVERY: 37.8 YR — SIGNIFICANT CHANGE UP
ALPHA FETOPROTEIN SERUM COMMENT: SIGNIFICANT CHANGE UP
ALPHA FETOPROTEIN SERUM COMMENT: SIGNIFICANT CHANGE UP
ALPHA FETOPROTEIN SERUM RESULTS: SIGNIFICANT CHANGE UP
ALPHA FETOPROTEIN SERUM RESULTS: SIGNIFICANT CHANGE UP
GA METHOD: SIGNIFICANT CHANGE UP
GA METHOD: SIGNIFICANT CHANGE UP
GA: 18.7 WEEKS — SIGNIFICANT CHANGE UP
GA: 18.7 WEEKS — SIGNIFICANT CHANGE UP
IDDM PATIENT QL: NO — SIGNIFICANT CHANGE UP
IDDM PATIENT QL: NO — SIGNIFICANT CHANGE UP
MULTIPLE PREGNANCY: NO — SIGNIFICANT CHANGE UP
MULTIPLE PREGNANCY: NO — SIGNIFICANT CHANGE UP
NEURAL TUBE DEFECT RISK FETUS: SIGNIFICANT CHANGE UP
NEURAL TUBE DEFECT RISK FETUS: SIGNIFICANT CHANGE UP
RACE AFP: SIGNIFICANT CHANGE UP
RACE AFP: SIGNIFICANT CHANGE UP

## 2023-12-07 ENCOUNTER — APPOINTMENT (OUTPATIENT)
Dept: MATERNAL FETAL MEDICINE | Facility: HOSPITAL | Age: 38
End: 2023-12-07
Payer: MEDICAID

## 2023-12-07 ENCOUNTER — ASOB RESULT (OUTPATIENT)
Age: 38
End: 2023-12-07

## 2023-12-07 PROCEDURE — 76811 OB US DETAILED SNGL FETUS: CPT | Mod: 26

## 2023-12-12 ENCOUNTER — APPOINTMENT (OUTPATIENT)
Dept: ENDOCRINOLOGY | Facility: CLINIC | Age: 38
End: 2023-12-12

## 2023-12-13 ENCOUNTER — NON-APPOINTMENT (OUTPATIENT)
Age: 38
End: 2023-12-13

## 2023-12-15 ENCOUNTER — APPOINTMENT (OUTPATIENT)
Dept: OBGYN | Facility: HOSPITAL | Age: 38
End: 2023-12-15

## 2023-12-15 ENCOUNTER — OUTPATIENT (OUTPATIENT)
Dept: OUTPATIENT SERVICES | Facility: HOSPITAL | Age: 38
LOS: 1 days | End: 2023-12-15

## 2023-12-15 VITALS
HEART RATE: 71 BPM | SYSTOLIC BLOOD PRESSURE: 108 MMHG | BODY MASS INDEX: 24.83 KG/M2 | WEIGHT: 149 LBS | DIASTOLIC BLOOD PRESSURE: 73 MMHG | HEIGHT: 65 IN | TEMPERATURE: 98.1 F

## 2023-12-15 DIAGNOSIS — K59.00 CONSTIPATION, UNSPECIFIED: ICD-10-CM

## 2023-12-15 DIAGNOSIS — Z34.91 ENCOUNTER FOR SUPERVISION OF NORMAL PREGNANCY, UNSPECIFIED, FIRST TRIMESTER: ICD-10-CM

## 2024-01-10 ENCOUNTER — NON-APPOINTMENT (OUTPATIENT)
Age: 39
End: 2024-01-10

## 2024-01-12 ENCOUNTER — RESULT REVIEW (OUTPATIENT)
Age: 39
End: 2024-01-12

## 2024-01-12 ENCOUNTER — APPOINTMENT (OUTPATIENT)
Dept: OBGYN | Facility: HOSPITAL | Age: 39
End: 2024-01-12

## 2024-01-12 ENCOUNTER — OUTPATIENT (OUTPATIENT)
Dept: OUTPATIENT SERVICES | Facility: HOSPITAL | Age: 39
LOS: 1 days | End: 2024-01-12

## 2024-01-12 VITALS
DIASTOLIC BLOOD PRESSURE: 61 MMHG | TEMPERATURE: 97.4 F | SYSTOLIC BLOOD PRESSURE: 108 MMHG | WEIGHT: 152 LBS | HEART RATE: 84 BPM | HEIGHT: 65 IN | BODY MASS INDEX: 25.33 KG/M2

## 2024-01-12 DIAGNOSIS — B37.31 ACUTE CANDIDIASIS OF VULVA AND VAGINA: ICD-10-CM

## 2024-01-12 DIAGNOSIS — O09.522 SUPERVISION OF ELDERLY MULTIGRAVIDA, SECOND TRIMESTER: ICD-10-CM

## 2024-01-12 DIAGNOSIS — Z34.91 ENCOUNTER FOR SUPERVISION OF NORMAL PREGNANCY, UNSPECIFIED, FIRST TRIMESTER: ICD-10-CM

## 2024-01-12 LAB
BASOPHILS # BLD AUTO: 0.08 K/UL — SIGNIFICANT CHANGE UP (ref 0–0.2)
BASOPHILS # BLD AUTO: 0.08 K/UL — SIGNIFICANT CHANGE UP (ref 0–0.2)
BASOPHILS NFR BLD AUTO: 0.8 % — SIGNIFICANT CHANGE UP (ref 0–2)
BASOPHILS NFR BLD AUTO: 0.8 % — SIGNIFICANT CHANGE UP (ref 0–2)
EOSINOPHIL # BLD AUTO: 0.56 K/UL — HIGH (ref 0–0.5)
EOSINOPHIL # BLD AUTO: 0.56 K/UL — HIGH (ref 0–0.5)
EOSINOPHIL NFR BLD AUTO: 5.8 % — SIGNIFICANT CHANGE UP (ref 0–6)
EOSINOPHIL NFR BLD AUTO: 5.8 % — SIGNIFICANT CHANGE UP (ref 0–6)
GLUCOSE 1H P MEAL SERPL-MCNC: 132 MG/DL — SIGNIFICANT CHANGE UP (ref 70–134)
GLUCOSE 1H P MEAL SERPL-MCNC: 132 MG/DL — SIGNIFICANT CHANGE UP (ref 70–134)
HCT VFR BLD CALC: 35.6 % — SIGNIFICANT CHANGE UP (ref 34.5–45)
HCT VFR BLD CALC: 35.6 % — SIGNIFICANT CHANGE UP (ref 34.5–45)
HGB BLD-MCNC: 11.4 G/DL — LOW (ref 11.5–15.5)
HGB BLD-MCNC: 11.4 G/DL — LOW (ref 11.5–15.5)
IANC: 6.02 K/UL — SIGNIFICANT CHANGE UP (ref 1.8–7.4)
IANC: 6.02 K/UL — SIGNIFICANT CHANGE UP (ref 1.8–7.4)
IMM GRANULOCYTES NFR BLD AUTO: 0.3 % — SIGNIFICANT CHANGE UP (ref 0–0.9)
IMM GRANULOCYTES NFR BLD AUTO: 0.3 % — SIGNIFICANT CHANGE UP (ref 0–0.9)
LYMPHOCYTES # BLD AUTO: 2.34 K/UL — SIGNIFICANT CHANGE UP (ref 1–3.3)
LYMPHOCYTES # BLD AUTO: 2.34 K/UL — SIGNIFICANT CHANGE UP (ref 1–3.3)
LYMPHOCYTES # BLD AUTO: 24.3 % — SIGNIFICANT CHANGE UP (ref 13–44)
LYMPHOCYTES # BLD AUTO: 24.3 % — SIGNIFICANT CHANGE UP (ref 13–44)
MCHC RBC-ENTMCNC: 27.1 PG — SIGNIFICANT CHANGE UP (ref 27–34)
MCHC RBC-ENTMCNC: 27.1 PG — SIGNIFICANT CHANGE UP (ref 27–34)
MCHC RBC-ENTMCNC: 32 GM/DL — SIGNIFICANT CHANGE UP (ref 32–36)
MCHC RBC-ENTMCNC: 32 GM/DL — SIGNIFICANT CHANGE UP (ref 32–36)
MCV RBC AUTO: 84.6 FL — SIGNIFICANT CHANGE UP (ref 80–100)
MCV RBC AUTO: 84.6 FL — SIGNIFICANT CHANGE UP (ref 80–100)
MONOCYTES # BLD AUTO: 0.58 K/UL — SIGNIFICANT CHANGE UP (ref 0–0.9)
MONOCYTES # BLD AUTO: 0.58 K/UL — SIGNIFICANT CHANGE UP (ref 0–0.9)
MONOCYTES NFR BLD AUTO: 6 % — SIGNIFICANT CHANGE UP (ref 2–14)
MONOCYTES NFR BLD AUTO: 6 % — SIGNIFICANT CHANGE UP (ref 2–14)
NEUTROPHILS # BLD AUTO: 6.02 K/UL — SIGNIFICANT CHANGE UP (ref 1.8–7.4)
NEUTROPHILS # BLD AUTO: 6.02 K/UL — SIGNIFICANT CHANGE UP (ref 1.8–7.4)
NEUTROPHILS NFR BLD AUTO: 62.8 % — SIGNIFICANT CHANGE UP (ref 43–77)
NEUTROPHILS NFR BLD AUTO: 62.8 % — SIGNIFICANT CHANGE UP (ref 43–77)
NRBC # BLD: 0 /100 WBCS — SIGNIFICANT CHANGE UP (ref 0–0)
NRBC # BLD: 0 /100 WBCS — SIGNIFICANT CHANGE UP (ref 0–0)
NRBC # FLD: 0 K/UL — SIGNIFICANT CHANGE UP (ref 0–0)
NRBC # FLD: 0 K/UL — SIGNIFICANT CHANGE UP (ref 0–0)
PLATELET # BLD AUTO: 252 K/UL — SIGNIFICANT CHANGE UP (ref 150–400)
PLATELET # BLD AUTO: 252 K/UL — SIGNIFICANT CHANGE UP (ref 150–400)
RBC # BLD: 4.21 M/UL — SIGNIFICANT CHANGE UP (ref 3.8–5.2)
RBC # BLD: 4.21 M/UL — SIGNIFICANT CHANGE UP (ref 3.8–5.2)
RBC # FLD: 13.4 % — SIGNIFICANT CHANGE UP (ref 10.3–14.5)
RBC # FLD: 13.4 % — SIGNIFICANT CHANGE UP (ref 10.3–14.5)
WBC # BLD: 9.61 K/UL — SIGNIFICANT CHANGE UP (ref 3.8–10.5)
WBC # BLD: 9.61 K/UL — SIGNIFICANT CHANGE UP (ref 3.8–10.5)
WBC # FLD AUTO: 9.61 K/UL — SIGNIFICANT CHANGE UP (ref 3.8–10.5)
WBC # FLD AUTO: 9.61 K/UL — SIGNIFICANT CHANGE UP (ref 3.8–10.5)

## 2024-01-12 RX ORDER — TERCONAZOLE 8 MG/G
0.8 CREAM VAGINAL
Qty: 20 | Refills: 1 | Status: COMPLETED | COMMUNITY
Start: 2024-01-12 | End: 2024-01-18

## 2024-01-13 LAB
24R-OH-CALCIDIOL SERPL-MCNC: 43.9 NG/ML — SIGNIFICANT CHANGE UP (ref 30–80)
24R-OH-CALCIDIOL SERPL-MCNC: 43.9 NG/ML — SIGNIFICANT CHANGE UP (ref 30–80)
T PALLIDUM AB TITR SER: NEGATIVE — SIGNIFICANT CHANGE UP
T PALLIDUM AB TITR SER: NEGATIVE — SIGNIFICANT CHANGE UP

## 2024-01-17 DIAGNOSIS — Z34.83 ENCOUNTER FOR SUPERVISION OF OTHER NORMAL PREGNANCY, THIRD TRIMESTER: ICD-10-CM

## 2024-01-23 ENCOUNTER — NON-APPOINTMENT (OUTPATIENT)
Age: 39
End: 2024-01-23

## 2024-01-23 RX ORDER — TERCONAZOLE 8 MG/G
0.8 CREAM VAGINAL
Qty: 1 | Refills: 1 | Status: ACTIVE | COMMUNITY
Start: 2024-01-23 | End: 1900-01-01

## 2024-01-29 ENCOUNTER — NON-APPOINTMENT (OUTPATIENT)
Age: 39
End: 2024-01-29

## 2024-02-09 ENCOUNTER — NON-APPOINTMENT (OUTPATIENT)
Age: 39
End: 2024-02-09

## 2024-02-09 ENCOUNTER — OUTPATIENT (OUTPATIENT)
Dept: OUTPATIENT SERVICES | Facility: HOSPITAL | Age: 39
LOS: 1 days | End: 2024-02-09

## 2024-02-09 ENCOUNTER — RESULT CHARGE (OUTPATIENT)
Age: 39
End: 2024-02-09

## 2024-02-09 ENCOUNTER — APPOINTMENT (OUTPATIENT)
Dept: MATERNAL FETAL MEDICINE | Facility: HOSPITAL | Age: 39
End: 2024-02-09
Payer: COMMERCIAL

## 2024-02-09 ENCOUNTER — APPOINTMENT (OUTPATIENT)
Dept: OBGYN | Facility: HOSPITAL | Age: 39
End: 2024-02-09
Payer: COMMERCIAL

## 2024-02-09 ENCOUNTER — MED ADMIN CHARGE (OUTPATIENT)
Age: 39
End: 2024-02-09

## 2024-02-09 ENCOUNTER — ASOB RESULT (OUTPATIENT)
Age: 39
End: 2024-02-09

## 2024-02-09 VITALS
SYSTOLIC BLOOD PRESSURE: 110 MMHG | DIASTOLIC BLOOD PRESSURE: 68 MMHG | TEMPERATURE: 97.5 F | WEIGHT: 155 LBS | HEIGHT: 65 IN | BODY MASS INDEX: 25.83 KG/M2 | HEART RATE: 95 BPM

## 2024-02-09 LAB — GLUCOSE BLDC GLUCOMTR-MCNC: 114

## 2024-02-09 PROCEDURE — 76819 FETAL BIOPHYS PROFIL W/O NST: CPT | Mod: 26,59

## 2024-02-09 PROCEDURE — 76816 OB US FOLLOW-UP PER FETUS: CPT | Mod: 26

## 2024-02-12 DIAGNOSIS — Z34.03 ENCOUNTER FOR SUPERVISION OF NORMAL FIRST PREGNANCY, THIRD TRIMESTER: ICD-10-CM

## 2024-02-21 ENCOUNTER — NON-APPOINTMENT (OUTPATIENT)
Age: 39
End: 2024-02-21

## 2024-02-23 ENCOUNTER — APPOINTMENT (OUTPATIENT)
Dept: OBGYN | Facility: HOSPITAL | Age: 39
End: 2024-02-23

## 2024-02-23 ENCOUNTER — OUTPATIENT (OUTPATIENT)
Dept: OUTPATIENT SERVICES | Facility: HOSPITAL | Age: 39
LOS: 1 days | End: 2024-02-23

## 2024-02-23 VITALS
DIASTOLIC BLOOD PRESSURE: 66 MMHG | HEIGHT: 65 IN | TEMPERATURE: 97.4 F | WEIGHT: 160 LBS | SYSTOLIC BLOOD PRESSURE: 107 MMHG | BODY MASS INDEX: 26.66 KG/M2 | HEART RATE: 83 BPM

## 2024-02-23 DIAGNOSIS — O09.519 SUPERVISION OF ELDERLY PRIMIGRAVIDA, UNSPECIFIED TRIMESTER: ICD-10-CM

## 2024-02-23 RX ORDER — VITAMIN A ACETATE, .BETA.-CAROTENE, ASCORBIC ACID, CHOLECALCIFEROL, .ALPHA.-TOCOPHEROL ACETATE, DL-, THIAMINE MONONITRATE, RIBOFLAVIN, NIACINAMIDE, PYRIDOXINE HYDROCHLORIDE, FOLIC ACID, CYANOCOBALAMIN, CALCIUM CARBONATE, FERROUS FUMARATE, ZINC OXIDE, CUPRIC OXIDE 3080; 920; 120; 400; 22; 1.84; 3; 20; 10; 1; 12; 200; 27; 25; 2 [IU]/1; [IU]/1; MG/1; [IU]/1; MG/1; MG/1; MG/1; MG/1; MG/1; MG/1; UG/1; MG/1; MG/1; MG/1; MG/1
27-1 TABLET, FILM COATED ORAL DAILY
Qty: 90 | Refills: 3 | Status: ACTIVE | COMMUNITY
Start: 2024-02-23 | End: 1900-01-01

## 2024-02-23 RX ORDER — ADHESIVE TAPE 3"X 2.3 YD
50 MCG TAPE, NON-MEDICATED TOPICAL
Qty: 90 | Refills: 3 | Status: ACTIVE | COMMUNITY
Start: 2023-10-20 | End: 1900-01-01

## 2024-03-06 ENCOUNTER — NON-APPOINTMENT (OUTPATIENT)
Age: 39
End: 2024-03-06

## 2024-03-08 ENCOUNTER — OUTPATIENT (OUTPATIENT)
Dept: OUTPATIENT SERVICES | Facility: HOSPITAL | Age: 39
LOS: 1 days | End: 2024-03-08

## 2024-03-08 ENCOUNTER — APPOINTMENT (OUTPATIENT)
Dept: OBGYN | Facility: HOSPITAL | Age: 39
End: 2024-03-08
Payer: MEDICAID

## 2024-03-08 ENCOUNTER — APPOINTMENT (OUTPATIENT)
Dept: MATERNAL FETAL MEDICINE | Facility: HOSPITAL | Age: 39
End: 2024-03-08
Payer: MEDICAID

## 2024-03-08 ENCOUNTER — ASOB RESULT (OUTPATIENT)
Age: 39
End: 2024-03-08

## 2024-03-08 VITALS
BODY MASS INDEX: 25.99 KG/M2 | SYSTOLIC BLOOD PRESSURE: 107 MMHG | WEIGHT: 156 LBS | HEIGHT: 65 IN | DIASTOLIC BLOOD PRESSURE: 68 MMHG | HEART RATE: 66 BPM | TEMPERATURE: 97.9 F

## 2024-03-08 PROCEDURE — 76816 OB US FOLLOW-UP PER FETUS: CPT | Mod: 26

## 2024-03-08 PROCEDURE — 76819 FETAL BIOPHYS PROFIL W/O NST: CPT | Mod: 26,59

## 2024-03-08 PROCEDURE — 99213 OFFICE O/P EST LOW 20 MIN: CPT

## 2024-03-11 DIAGNOSIS — Z34.83 ENCOUNTER FOR SUPERVISION OF OTHER NORMAL PREGNANCY, THIRD TRIMESTER: ICD-10-CM

## 2024-03-18 ENCOUNTER — NON-APPOINTMENT (OUTPATIENT)
Age: 39
End: 2024-03-18

## 2024-03-22 ENCOUNTER — APPOINTMENT (OUTPATIENT)
Dept: OBGYN | Facility: HOSPITAL | Age: 39
End: 2024-03-22
Payer: MEDICAID

## 2024-03-22 ENCOUNTER — RESULT REVIEW (OUTPATIENT)
Age: 39
End: 2024-03-22

## 2024-03-22 ENCOUNTER — OUTPATIENT (OUTPATIENT)
Dept: OUTPATIENT SERVICES | Facility: HOSPITAL | Age: 39
LOS: 1 days | End: 2024-03-22

## 2024-03-22 VITALS
WEIGHT: 163 LBS | HEIGHT: 65 IN | DIASTOLIC BLOOD PRESSURE: 80 MMHG | HEART RATE: 71 BPM | TEMPERATURE: 97 F | SYSTOLIC BLOOD PRESSURE: 116 MMHG | BODY MASS INDEX: 27.16 KG/M2

## 2024-03-22 LAB
HCT VFR BLD CALC: 40.9 % — SIGNIFICANT CHANGE UP (ref 34.5–45)
HGB BLD-MCNC: 13.2 G/DL — SIGNIFICANT CHANGE UP (ref 11.5–15.5)
HIV 1+2 AB+HIV1 P24 AG SERPL QL IA: SIGNIFICANT CHANGE UP
MCHC RBC-ENTMCNC: 26.8 PG — LOW (ref 27–34)
MCHC RBC-ENTMCNC: 32.3 GM/DL — SIGNIFICANT CHANGE UP (ref 32–36)
MCV RBC AUTO: 83.1 FL — SIGNIFICANT CHANGE UP (ref 80–100)
NRBC # BLD: 0 /100 WBCS — SIGNIFICANT CHANGE UP (ref 0–0)
NRBC # FLD: 0 K/UL — SIGNIFICANT CHANGE UP (ref 0–0)
PLATELET # BLD AUTO: 256 K/UL — SIGNIFICANT CHANGE UP (ref 150–400)
RBC # BLD: 4.92 M/UL — SIGNIFICANT CHANGE UP (ref 3.8–5.2)
RBC # FLD: 13.5 % — SIGNIFICANT CHANGE UP (ref 10.3–14.5)
WBC # BLD: 8.98 K/UL — SIGNIFICANT CHANGE UP (ref 3.8–10.5)
WBC # FLD AUTO: 8.98 K/UL — SIGNIFICANT CHANGE UP (ref 3.8–10.5)

## 2024-03-22 PROCEDURE — 99213 OFFICE O/P EST LOW 20 MIN: CPT

## 2024-03-22 RX ORDER — ASPIRIN 81 MG/1
81 TABLET, COATED ORAL DAILY
Qty: 60 | Refills: 3 | Status: ACTIVE | COMMUNITY
Start: 2023-11-17 | End: 1900-01-01

## 2024-03-23 LAB
GROUP B BETA STREP DNA (PCR): SIGNIFICANT CHANGE UP
SOURCE GROUP B STREP: SIGNIFICANT CHANGE UP

## 2024-03-25 DIAGNOSIS — Z34.83 ENCOUNTER FOR SUPERVISION OF OTHER NORMAL PREGNANCY, THIRD TRIMESTER: ICD-10-CM

## 2024-03-27 ENCOUNTER — NON-APPOINTMENT (OUTPATIENT)
Age: 39
End: 2024-03-27

## 2024-03-29 ENCOUNTER — OUTPATIENT (OUTPATIENT)
Dept: OUTPATIENT SERVICES | Facility: HOSPITAL | Age: 39
LOS: 1 days | End: 2024-03-29

## 2024-03-29 ENCOUNTER — NON-APPOINTMENT (OUTPATIENT)
Age: 39
End: 2024-03-29

## 2024-03-29 ENCOUNTER — APPOINTMENT (OUTPATIENT)
Dept: OBGYN | Facility: HOSPITAL | Age: 39
End: 2024-03-29
Payer: MEDICAID

## 2024-03-29 VITALS
HEART RATE: 74 BPM | BODY MASS INDEX: 26.99 KG/M2 | TEMPERATURE: 97.5 F | SYSTOLIC BLOOD PRESSURE: 112 MMHG | WEIGHT: 162 LBS | DIASTOLIC BLOOD PRESSURE: 73 MMHG | HEIGHT: 65 IN

## 2024-03-29 PROCEDURE — 99213 OFFICE O/P EST LOW 20 MIN: CPT

## 2024-03-29 RX ORDER — PRENATAL VIT 27,CALC/IRON/FA 60 MG-1 MG
60-1 TABLET ORAL DAILY
Qty: 100 | Refills: 0 | Status: ACTIVE | OUTPATIENT
Start: 2024-03-29 | End: 2024-07-07

## 2024-04-04 ENCOUNTER — TRANSCRIPTION ENCOUNTER (OUTPATIENT)
Age: 39
End: 2024-04-04

## 2024-04-05 ENCOUNTER — APPOINTMENT (OUTPATIENT)
Dept: MATERNAL FETAL MEDICINE | Facility: HOSPITAL | Age: 39
End: 2024-04-05

## 2024-04-05 ENCOUNTER — APPOINTMENT (OUTPATIENT)
Dept: ANTEPARTUM | Facility: CLINIC | Age: 39
End: 2024-04-05

## 2024-04-05 ENCOUNTER — INPATIENT (INPATIENT)
Facility: HOSPITAL | Age: 39
LOS: 2 days | Discharge: HOME | End: 2024-04-08
Attending: SPECIALIST | Admitting: SPECIALIST
Payer: MEDICAID

## 2024-04-05 ENCOUNTER — APPOINTMENT (OUTPATIENT)
Dept: OBGYN | Facility: HOSPITAL | Age: 39
End: 2024-04-05

## 2024-04-05 VITALS
TEMPERATURE: 98 F | DIASTOLIC BLOOD PRESSURE: 55 MMHG | SYSTOLIC BLOOD PRESSURE: 124 MMHG | RESPIRATION RATE: 15 BRPM | HEART RATE: 73 BPM

## 2024-04-05 DIAGNOSIS — Z03.71 ENCOUNTER FOR SUSPECTED PROBLEM WITH AMNIOTIC CAVITY AND MEMBRANE RULED OUT: ICD-10-CM

## 2024-04-05 DIAGNOSIS — O26.899 OTHER SPECIFIED PREGNANCY RELATED CONDITIONS, UNSPECIFIED TRIMESTER: ICD-10-CM

## 2024-04-05 LAB
AMNISURE ROM (RUPTURE OF MEMBRANES): POSITIVE
BASOPHILS # BLD AUTO: 0.08 K/UL — SIGNIFICANT CHANGE UP (ref 0–0.2)
BASOPHILS NFR BLD AUTO: 0.9 % — SIGNIFICANT CHANGE UP (ref 0–2)
BLD GP AB SCN SERPL QL: NEGATIVE — SIGNIFICANT CHANGE UP
EOSINOPHIL # BLD AUTO: 0.4 K/UL — SIGNIFICANT CHANGE UP (ref 0–0.5)
EOSINOPHIL NFR BLD AUTO: 4.3 % — SIGNIFICANT CHANGE UP (ref 0–6)
HCT VFR BLD CALC: 39 % — SIGNIFICANT CHANGE UP (ref 34.5–45)
HGB BLD-MCNC: 12.5 G/DL — SIGNIFICANT CHANGE UP (ref 11.5–15.5)
IANC: 5.27 K/UL — SIGNIFICANT CHANGE UP (ref 1.8–7.4)
IMM GRANULOCYTES NFR BLD AUTO: 0.2 % — SIGNIFICANT CHANGE UP (ref 0–0.9)
LYMPHOCYTES # BLD AUTO: 2.82 K/UL — SIGNIFICANT CHANGE UP (ref 1–3.3)
LYMPHOCYTES # BLD AUTO: 30.4 % — SIGNIFICANT CHANGE UP (ref 13–44)
MCHC RBC-ENTMCNC: 26.7 PG — LOW (ref 27–34)
MCHC RBC-ENTMCNC: 32.1 GM/DL — SIGNIFICANT CHANGE UP (ref 32–36)
MCV RBC AUTO: 83.2 FL — SIGNIFICANT CHANGE UP (ref 80–100)
MONOCYTES # BLD AUTO: 0.69 K/UL — SIGNIFICANT CHANGE UP (ref 0–0.9)
MONOCYTES NFR BLD AUTO: 7.4 % — SIGNIFICANT CHANGE UP (ref 2–14)
NEUTROPHILS # BLD AUTO: 5.27 K/UL — SIGNIFICANT CHANGE UP (ref 1.8–7.4)
NEUTROPHILS NFR BLD AUTO: 56.8 % — SIGNIFICANT CHANGE UP (ref 43–77)
NRBC # BLD: 0 /100 WBCS — SIGNIFICANT CHANGE UP (ref 0–0)
NRBC # FLD: 0 K/UL — SIGNIFICANT CHANGE UP (ref 0–0)
PLATELET # BLD AUTO: 248 K/UL — SIGNIFICANT CHANGE UP (ref 150–400)
RBC # BLD: 4.69 M/UL — SIGNIFICANT CHANGE UP (ref 3.8–5.2)
RBC # FLD: 14.4 % — SIGNIFICANT CHANGE UP (ref 10.3–14.5)
RH IG SCN BLD-IMP: POSITIVE — SIGNIFICANT CHANGE UP
T PALLIDUM AB TITR SER: NEGATIVE — SIGNIFICANT CHANGE UP
WBC # BLD: 9.28 K/UL — SIGNIFICANT CHANGE UP (ref 3.8–10.5)
WBC # FLD AUTO: 9.28 K/UL — SIGNIFICANT CHANGE UP (ref 3.8–10.5)

## 2024-04-05 PROCEDURE — 59514 CESAREAN DELIVERY ONLY: CPT | Mod: U7

## 2024-04-05 PROCEDURE — 88307 TISSUE EXAM BY PATHOLOGIST: CPT | Mod: 26

## 2024-04-05 DEVICE — INTERCEED 3 X 4": Type: IMPLANTABLE DEVICE | Status: FUNCTIONAL

## 2024-04-05 RX ORDER — ONDANSETRON 8 MG/1
4 TABLET, FILM COATED ORAL EVERY 6 HOURS
Refills: 0 | Status: DISCONTINUED | OUTPATIENT
Start: 2024-04-05 | End: 2024-04-07

## 2024-04-05 RX ORDER — OXYTOCIN 10 UNIT/ML
VIAL (ML) INJECTION
Qty: 30 | Refills: 0 | Status: DISCONTINUED | OUTPATIENT
Start: 2024-04-05 | End: 2024-04-05

## 2024-04-05 RX ORDER — SODIUM CHLORIDE 9 MG/ML
1000 INJECTION, SOLUTION INTRAVENOUS
Refills: 0 | Status: DISCONTINUED | OUTPATIENT
Start: 2024-04-05 | End: 2024-04-05

## 2024-04-05 RX ORDER — CHLORHEXIDINE GLUCONATE 213 G/1000ML
1 SOLUTION TOPICAL DAILY
Refills: 0 | Status: DISCONTINUED | OUTPATIENT
Start: 2024-04-05 | End: 2024-04-05

## 2024-04-05 RX ORDER — BUTORPHANOL TARTRATE 2 MG/ML
0.12 INJECTION, SOLUTION INTRAMUSCULAR; INTRAVENOUS EVERY 6 HOURS
Refills: 0 | Status: DISCONTINUED | OUTPATIENT
Start: 2024-04-05 | End: 2024-04-07

## 2024-04-05 RX ORDER — MORPHINE SULFATE 50 MG/1
2 CAPSULE, EXTENDED RELEASE ORAL ONCE
Refills: 0 | Status: DISCONTINUED | OUTPATIENT
Start: 2024-04-05 | End: 2024-04-07

## 2024-04-05 RX ORDER — OXYTOCIN 10 UNIT/ML
333.33 VIAL (ML) INJECTION
Qty: 20 | Refills: 0 | Status: DISCONTINUED | OUTPATIENT
Start: 2024-04-05 | End: 2024-04-05

## 2024-04-05 RX ORDER — SODIUM CHLORIDE 9 MG/ML
1000 INJECTION, SOLUTION INTRAVENOUS
Refills: 0 | Status: DISCONTINUED | OUTPATIENT
Start: 2024-04-05 | End: 2024-04-07

## 2024-04-05 RX ORDER — OXYCODONE HYDROCHLORIDE 5 MG/1
5 TABLET ORAL
Refills: 0 | Status: DISCONTINUED | OUTPATIENT
Start: 2024-04-05 | End: 2024-04-05

## 2024-04-05 RX ORDER — NALOXONE HYDROCHLORIDE 4 MG/.1ML
0.1 SPRAY NASAL
Refills: 0 | Status: DISCONTINUED | OUTPATIENT
Start: 2024-04-05 | End: 2024-04-07

## 2024-04-05 RX ORDER — SODIUM CHLORIDE 9 MG/ML
500 INJECTION, SOLUTION INTRAVENOUS ONCE
Refills: 0 | Status: DISCONTINUED | OUTPATIENT
Start: 2024-04-05 | End: 2024-04-05

## 2024-04-05 RX ORDER — HYDROMORPHONE HYDROCHLORIDE 2 MG/ML
1 INJECTION INTRAMUSCULAR; INTRAVENOUS; SUBCUTANEOUS
Refills: 0 | Status: DISCONTINUED | OUTPATIENT
Start: 2024-04-05 | End: 2024-04-05

## 2024-04-05 RX ORDER — OXYTOCIN 10 UNIT/ML
333.33 VIAL (ML) INJECTION
Qty: 20 | Refills: 0 | Status: COMPLETED | OUTPATIENT
Start: 2024-04-05 | End: 2024-04-05

## 2024-04-05 RX ORDER — OXYCODONE HYDROCHLORIDE 5 MG/1
10 TABLET ORAL
Refills: 0 | Status: DISCONTINUED | OUTPATIENT
Start: 2024-04-05 | End: 2024-04-05

## 2024-04-05 RX ADMIN — Medication 1000 MILLIUNIT(S)/MIN: at 22:58

## 2024-04-05 RX ADMIN — Medication 2 MILLIUNIT(S)/MIN: at 10:27

## 2024-04-05 RX ADMIN — SODIUM CHLORIDE 75 MILLILITER(S): 9 INJECTION, SOLUTION INTRAVENOUS at 22:57

## 2024-04-05 RX ADMIN — SODIUM CHLORIDE 125 MILLILITER(S): 9 INJECTION, SOLUTION INTRAVENOUS at 10:27

## 2024-04-05 RX ADMIN — SODIUM CHLORIDE 125 MILLILITER(S): 9 INJECTION, SOLUTION INTRAVENOUS at 08:57

## 2024-04-05 NOTE — OB RN INTRAOPERATIVE NOTE - NSSELHIDDEN_OBGYN_ALL_OB_FT
[[email or web address]_ALL_OB_FT:[id number]],[NS_DeliveryRN_OBGYN_ALL_OB_FT:[id number]]

## 2024-04-05 NOTE — OB PROVIDER DELIVERY SUMMARY - NSCAT2TRACINGDETAIL_OBGYN_A_OB
Prolonged Deceleration greater than 3 minutes or bradycardia Tranexamic Acid Pregnancy And Lactation Text: It is unknown if this medication is safe during pregnancy or breast feeding.

## 2024-04-05 NOTE — OB PROVIDER TRIAGE NOTE - HISTORY OF PRESENT ILLNESS
Pt is 39 y/o  at 38w 5d who presents to r/o srom. She states that she had a gush of fluid at 230am, clear and states that she has continued to leak fluid since. She denies contractions, vaginal bleeding. +FM.     PNC at [facility]. AP Course complicated by:   - History of hyperprolactinemia diagnosed before pregnancy. Pt was taking Carbergoline before pregnancy and had MRI that showed no evidence of pituitary adenoma. Pt follows endocrinologist Dr Ying Ventura   - GBS negative 3/23/24   - Last ATU scan 3/11/24: EFW 2363gm, cephalic, anterior placenta

## 2024-04-05 NOTE — OB RN PATIENT PROFILE - NS_TUBALPLANNED_OBGYN_ALL_OB
[facility]  Obstetrics and Gynecology  [address] [place] [address]  Phone: [phone]  Fax: [phone]  Follow Up Time: 2 weeks   No

## 2024-04-05 NOTE — OB PROVIDER H&P - PROBLEM SELECTOR PLAN 1
NST reactive, irregular mild ctx  amnisure POSITIVE  d/w Dr. Cain PGY 4 and Dr. Bell  pt admitted for IOL for PROM  for pitocin and CB  GBS neg  routine labor orders  consents obtained

## 2024-04-05 NOTE — OB PROVIDER LABOR PROGRESS NOTE - ASSESSMENT
- Pt exam unchanged from prior   - Bolus given   - Reposition pt   - Cont EFM, karrio     Gladys Moe PGY-1

## 2024-04-05 NOTE — OB PROVIDER TRIAGE NOTE - NS_LMP_OBGYN_ALL_OB_DT
----- Message from Violeta Stallworth MD sent at 3/6/2024  2:11 PM CST -----  Your mammogram and right breast ultrasound were reassuring.  Radiologist saw masses that were unchanged and benign (non cancerous)  And recommended a 1 year screening mammogram.  Thank you  Dr Stallworth    Please notify patient of results.  
Left vm for pt with mammogram and ultrasound and to repeat in one year.  
07-Jul-2023

## 2024-04-05 NOTE — OB PROVIDER DELIVERY SUMMARY - NSPROVIDERDELIVERYNOTE_OBGYN_ALL_OB_FT
emergency  consent obtained verbally from patient due to cat 3 tracing remote from delivery emergent pLTCS for fetal bradycardia  Viable F infant, apgars 9/9, weight 2900g  Large amount of blood clot present when entering uterus, suspect abruptuon   Hysterotomy closed in 1 layer using vicryl  Small non-expanding hematoma on L aspect of hysterotomy, oversewn with vicryl figure of 8   Grossly normal uterus, tubes, and ovaries  Abdomen closed in standard fashion  Pt and infant to recovery in stable condition  Placenta to pathology  qBL:855   IVF:1500    UOP:850    Jane Medina MD PGY4     Attending addendum:   emergency  consent obtained verbally from patient due to cat 3 tracing remote from delivery  Madhu COLON

## 2024-04-05 NOTE — OB RN DELIVERY SUMMARY - NSSELHIDDEN_OBGYN_ALL_OB_FT
[NS_DeliveryAttending1_OBGYN_ALL_OB_FT:[id number]==],[NS_DeliveryRN_OBGYN_ALL_OB_FT:[id number]=]

## 2024-04-05 NOTE — OB RN DELIVERY SUMMARY - NS_SEPSISRSKCALC_OBGYN_ALL_OB_FT
EOS calculated successfully. EOS Risk Factor: 0.5/1000 live births ([facility] national incidence); GA=38w5d; Temp=98.6; ROM=17.917; GBS='Negative'; Antibiotics='No antibiotics or any antibiotics < 2 hrs prior to birth'

## 2024-04-05 NOTE — OB PROVIDER DELIVERY SUMMARY - NSSELHIDDEN_OBGYN_ALL_OB_FT
[NS_DeliveryAttending1_OBGYN_ALL_OB_FT:[id number]==],[NS_DeliveryAssist1_OBGYN_ALL_OB_FT:[id number]]

## 2024-04-05 NOTE — OB PROVIDER TRIAGE NOTE - NSOBPROVIDERNOTE_OBGYN_ALL_OB_FT
This is a 39 y/o  at 38W 5D who presents to rule out spontaneous rupture of membranes     Amnisure pending   Discussed with Dr. Hagan This is a 37 y/o  at 38W 5D who presents to rule out spontaneous rupture of membranes     Amnisure pending   Discussed with Dr. Hagan    Report of care taken from [facility]shift ALISON LONG  NST reactive, irregular mild ctx  amnisure POSITIVE  d/w  pt admitted for IOL for PROM  for  routine labor orders  consents obtained This is a 37 y/o  at 38W 5D who presents to rule out spontaneous rupture of membranes     Amnisure pending   Discussed with Dr. Hagan    Report of care taken from [facility]  NST reactive, irregular mild ctx  amnisure POSITIVE  d/w Dr. Cain PGY 4 and Dr. Bell  pt admitted for IOL for PROM  for pitocin and CB  GBS neg  routine labor orders  consents obtained

## 2024-04-05 NOTE — OB PROVIDER H&P - HISTORY OF PRESENT ILLNESS
Pt is 37 y/o  at 38w 5d who presents to r/o srom. She states that she had a gush of fluid at 230am, clear and states that she has continued to leak fluid since. She denies contractions, vaginal bleeding. +FM.     PNC at [facility]. AP Course complicated by:   - History of hyperprolactinemia diagnosed before pregnancy. Pt was taking Carbergoline before pregnancy and had MRI that showed no evidence of pituitary adenoma. Pt follows endocrinologist Dr Ying Ventura   - GBS negative 3/23/24   - Last ATU scan 3/11/24: EFW 2363gm, cephalic, anterior placenta

## 2024-04-05 NOTE — OB PROVIDER LABOR PROGRESS NOTE - NS_SUBJECTIVE/OBJECTIVE_OBGYN_ALL_OB_FT
PGY1 Labor & Delivery Progress Note     Pt seen & examined for updated VE, repositioning in the setting of late decelerations     SVE: 2/90/-2  EFM: 130/min-mod. variability/+accles/+intertmittent late decels  [place]: q2-3 min    T(C): 37.0 (04-05-24 @ 18:32), Max: 37.0 (04-05-24 @ 12:06)  HR: 62 (04-05-24 @ 19:33) (54 - 88)  BP: 113/77 (04-05-24 @ 19:26) (90/52 - 147/78)  RR: 16 (04-05-24 @ 18:32) (15 - 18)  SpO2: 100% (04-05-24 @ 19:33) (91% - 100%)

## 2024-04-05 NOTE — OB PROVIDER H&P - NSLOWPPHRISK_OBGYN_A_OB
No previous uterine incision/[place] Pregnancy/Less than or equal to 4 previous vaginal births/No known bleeding disorder/No history of postpartum hemorrhage/No other PPH risks indicated

## 2024-04-05 NOTE — OB PROVIDER TRIAGE NOTE - NSHPPHYSICALEXAM_GEN_ALL_CORE
T(C): 36.7 (04-05-24 @ 06:31), Max: 36.7 (04-05-24 @ 06:09)  HR: 70 (04-05-24 @ 06:35) (70 - 73)  BP: 129/63 (04-05-24 @ 06:35) (124/55 - 129/63)  RR: 15 (04-05-24 @ 06:09) (15 - 15)  SpO2: --  General: Female sitting comfortably   Neuro: No facial asymmetry, no slurred speech, moves all 4 extremities  Lungs: No respiratory distress  Abdomen: Soft, nontender. Gravid.   TAUS:  Cephalic, anterior placenta, M mode 130, JULIOCESAR 17.5, MVP 6.15. Sono saved in ASOB.   NST: baseline 130, moderate variability, +accels, no decels   SSE: small amount of pooling, +nitrizine, -ferning. Amnisure done  SVE: 1/50/-3  EFW: 7lb Leopold

## 2024-04-05 NOTE — OB RN PATIENT PROFILE - FALL HARM RISK - UNIVERSAL INTERVENTIONS
Bed in lowest position, wheels locked, appropriate side rails in place/Call bell, personal items and telephone in reach/Instruct patient to call for assistance before getting out of bed or chair/Non-slip footwear when patient is out of bed/Rayne to call system/Physically safe environment - no spills, clutter or unnecessary equipment/Purposeful Proactive Rounding/Room/bathroom lighting operational, light cord in reach

## 2024-04-06 LAB
BASOPHILS # BLD AUTO: 0.05 K/UL — SIGNIFICANT CHANGE UP (ref 0–0.2)
BASOPHILS NFR BLD AUTO: 0.2 % — SIGNIFICANT CHANGE UP (ref 0–2)
EOSINOPHIL # BLD AUTO: 0.02 K/UL — SIGNIFICANT CHANGE UP (ref 0–0.5)
EOSINOPHIL NFR BLD AUTO: 0.1 % — SIGNIFICANT CHANGE UP (ref 0–6)
HCT VFR BLD CALC: 32.3 % — LOW (ref 34.5–45)
HGB BLD-MCNC: 10.4 G/DL — LOW (ref 11.5–15.5)
IANC: 17.56 K/UL — HIGH (ref 1.8–7.4)
IMM GRANULOCYTES NFR BLD AUTO: 0.5 % — SIGNIFICANT CHANGE UP (ref 0–0.9)
LYMPHOCYTES # BLD AUTO: 1.7 K/UL — SIGNIFICANT CHANGE UP (ref 1–3.3)
LYMPHOCYTES # BLD AUTO: 8.2 % — LOW (ref 13–44)
MCHC RBC-ENTMCNC: 26.9 PG — LOW (ref 27–34)
MCHC RBC-ENTMCNC: 32.2 GM/DL — SIGNIFICANT CHANGE UP (ref 32–36)
MCV RBC AUTO: 83.7 FL — SIGNIFICANT CHANGE UP (ref 80–100)
MONOCYTES # BLD AUTO: 1.28 K/UL — HIGH (ref 0–0.9)
MONOCYTES NFR BLD AUTO: 6.2 % — SIGNIFICANT CHANGE UP (ref 2–14)
NEUTROPHILS # BLD AUTO: 17.56 K/UL — HIGH (ref 1.8–7.4)
NEUTROPHILS NFR BLD AUTO: 84.8 % — HIGH (ref 43–77)
NRBC # BLD: 0 /100 WBCS — SIGNIFICANT CHANGE UP (ref 0–0)
NRBC # FLD: 0 K/UL — SIGNIFICANT CHANGE UP (ref 0–0)
PLATELET # BLD AUTO: 210 K/UL — SIGNIFICANT CHANGE UP (ref 150–400)
RBC # BLD: 3.86 M/UL — SIGNIFICANT CHANGE UP (ref 3.8–5.2)
RBC # FLD: 14.5 % — SIGNIFICANT CHANGE UP (ref 10.3–14.5)
WBC # BLD: 20.71 K/UL — HIGH (ref 3.8–10.5)
WBC # FLD AUTO: 20.71 K/UL — HIGH (ref 3.8–10.5)

## 2024-04-06 RX ORDER — TETANUS TOXOID, REDUCED DIPHTHERIA TOXOID AND ACELLULAR PERTUSSIS VACCINE, ADSORBED 5; 2.5; 8; 8; 2.5 [IU]/.5ML; [IU]/.5ML; UG/.5ML; UG/.5ML; UG/.5ML
0.5 SUSPENSION INTRAMUSCULAR ONCE
Refills: 0 | Status: DISCONTINUED | OUTPATIENT
Start: 2024-04-06 | End: 2024-04-08

## 2024-04-06 RX ORDER — HEPARIN SODIUM 5000 [USP'U]/ML
5000 INJECTION INTRAVENOUS; SUBCUTANEOUS EVERY 12 HOURS
Refills: 0 | Status: DISCONTINUED | OUTPATIENT
Start: 2024-04-06 | End: 2024-04-08

## 2024-04-06 RX ORDER — SODIUM CHLORIDE 9 MG/ML
1000 INJECTION, SOLUTION INTRAVENOUS
Refills: 0 | Status: DISCONTINUED | OUTPATIENT
Start: 2024-04-06 | End: 2024-04-08

## 2024-04-06 RX ORDER — SODIUM CHLORIDE 9 MG/ML
1000 INJECTION, SOLUTION INTRAVENOUS ONCE
Refills: 0 | Status: COMPLETED | OUTPATIENT
Start: 2024-04-06 | End: 2024-04-06

## 2024-04-06 RX ORDER — IBUPROFEN 200 MG
600 TABLET ORAL EVERY 6 HOURS
Refills: 0 | Status: DISCONTINUED | OUTPATIENT
Start: 2024-04-06 | End: 2024-04-08

## 2024-04-06 RX ORDER — DIPHENHYDRAMINE HCL 50 MG
25 CAPSULE ORAL EVERY 6 HOURS
Refills: 0 | Status: DISCONTINUED | OUTPATIENT
Start: 2024-04-06 | End: 2024-04-08

## 2024-04-06 RX ORDER — OXYCODONE HYDROCHLORIDE 5 MG/1
5 TABLET ORAL
Refills: 0 | Status: DISCONTINUED | OUTPATIENT
Start: 2024-04-06 | End: 2024-04-08

## 2024-04-06 RX ORDER — OXYCODONE HYDROCHLORIDE 5 MG/1
5 TABLET ORAL ONCE
Refills: 0 | Status: DISCONTINUED | OUTPATIENT
Start: 2024-04-06 | End: 2024-04-08

## 2024-04-06 RX ORDER — IBUPROFEN 200 MG
600 TABLET ORAL EVERY 6 HOURS
Refills: 0 | Status: COMPLETED | OUTPATIENT
Start: 2024-04-06 | End: 2025-03-05

## 2024-04-06 RX ORDER — ACETAMINOPHEN 500 MG
975 TABLET ORAL
Refills: 0 | Status: DISCONTINUED | OUTPATIENT
Start: 2024-04-06 | End: 2024-04-08

## 2024-04-06 RX ORDER — MAGNESIUM HYDROXIDE 400 MG/1
30 TABLET, CHEWABLE ORAL
Refills: 0 | Status: DISCONTINUED | OUTPATIENT
Start: 2024-04-06 | End: 2024-04-08

## 2024-04-06 RX ORDER — SIMETHICONE 80 MG/1
80 TABLET, CHEWABLE ORAL EVERY 4 HOURS
Refills: 0 | Status: DISCONTINUED | OUTPATIENT
Start: 2024-04-06 | End: 2024-04-08

## 2024-04-06 RX ORDER — LANOLIN
1 OINTMENT (GRAM) TOPICAL EVERY 6 HOURS
Refills: 0 | Status: DISCONTINUED | OUTPATIENT
Start: 2024-04-06 | End: 2024-04-08

## 2024-04-06 RX ORDER — OXYTOCIN 10 UNIT/ML
333.33 VIAL (ML) INJECTION
Qty: 20 | Refills: 0 | Status: DISCONTINUED | OUTPATIENT
Start: 2024-04-06 | End: 2024-04-08

## 2024-04-06 RX ADMIN — HEPARIN SODIUM 5000 UNIT(S): 5000 INJECTION INTRAVENOUS; SUBCUTANEOUS at 18:34

## 2024-04-06 RX ADMIN — Medication 975 MILLIGRAM(S): at 21:01

## 2024-04-06 RX ADMIN — Medication 600 MILLIGRAM(S): at 19:01

## 2024-04-06 RX ADMIN — Medication 975 MILLIGRAM(S): at 21:31

## 2024-04-06 RX ADMIN — HEPARIN SODIUM 5000 UNIT(S): 5000 INJECTION INTRAVENOUS; SUBCUTANEOUS at 05:49

## 2024-04-06 RX ADMIN — Medication 600 MILLIGRAM(S): at 12:51

## 2024-04-06 RX ADMIN — Medication 975 MILLIGRAM(S): at 09:09

## 2024-04-06 RX ADMIN — Medication 600 MILLIGRAM(S): at 18:31

## 2024-04-06 RX ADMIN — Medication 600 MILLIGRAM(S): at 13:21

## 2024-04-06 RX ADMIN — Medication 975 MILLIGRAM(S): at 09:39

## 2024-04-06 RX ADMIN — SODIUM CHLORIDE 1000 MILLILITER(S): 9 INJECTION, SOLUTION INTRAVENOUS at 06:41

## 2024-04-06 NOTE — PROGRESS NOTE ADULT - SUBJECTIVE AND OBJECTIVE BOX
OB Progress Note:  Delivery, POD#1    S: 39yo POD#1 s/p pLTCS due to cat II c/b abruption. Pain well controlled, tolerating regular diet, not yet passing flatus, ambulating without difficulty. Pt with giles catheter.  Denies heavy vaginal bleeding, N/V, CP/SOB/lightheadedness/dizziness.     O:   Vital Signs Last 24 Hrs  T(C): 36.7 (2024 06:15), Max: 37.0 (2024 12:06)  T(F): 98 (2024 06:15), Max: 98.6 (2024 12:06)  HR: 56 (2024 06:15) (54 - 88)  BP: 105/58 (2024 06:15) (90/52 - 147/78)  BP(mean): 74 (2024 01:00) (55 - 89)  RR: 17 (2024 06:15) (12 - 19)  SpO2: 100% (2024 06:15) (91% - 100%)    Parameters below as of 2024 06:15  Patient On (Oxygen Delivery Method): room air        Labs:  Blood type: O Positive  Rubella IgG: RPR: Negative                          12.5   9.28 >-----------< 248    (  @ 07:55 )             39.0                  PE:  General: NAD  CV: RRR  Resp: CTAB  Abdomen: Mildly distended, appropriately tender, Fundus firm, incision c/d/i.  : Nl lochia, giles with yellow urine, appropriate amount  Extremities: No erythema, no pitting edema

## 2024-04-06 NOTE — PROVIDER CONTACT NOTE (OTHER) - ACTION/TREATMENT ORDERED:
Will order IV fluid bolus. Defer giles removal to 8/9am. Continue to monitor for s/s of acute distress. No further interventions at this time.

## 2024-04-06 NOTE — OB NEONATOLOGY/PEDIATRICIAN DELIVERY SUMMARY - NSCSDELIVASCHE_OBGYN_ALL_OB
Lalo Stack is a 67 y.o.  male and presents with     Chief Complaint   Patient presents with    Irregular Heart Beat    Diabetes    Leg Swelling    Bleeding/Bruising     Pt is taking lasix 40 mg in am and 20 mg at hs. He still has swelling in his legs. He reduced the Neurontin to 600 mg po bid. He did fall once when he mis stepped and another hit his shin against his walker and car door. He has small bruise on his rt shin. Pt does have mild CROWELL. Pt is taking couamdin as directed for Par Afib, no bleeding    Pt is also here for lab results. He has gained around 18 pounds over the course of past 1 year. Past Medical History:   Diagnosis Date    Asthma     BPH (benign prostatic hyperplasia)     Herniated disc, cervical     Knee pain     Pacemaker 2011    [facility]    PAF (paroxysmal atrial fibrillation) ([facility])     During Pacer interogation     SSS (sick sinus syndrome) ([facility])     S/P Dual chamber [employer] Pacemaker     Past Surgical History:   Procedure Laterality Date    HX HERNIA REPAIR Bilateral 1989    HX HIP REPLACEMENT  2006    right    HX KNEE REPLACEMENT Bilateral 1992/1993/2006/2008/2011/2012/2013    HX PACEMAKER  2011     Current Outpatient Prescriptions   Medication Sig    furosemide (LASIX) 40 mg tablet One po bid    gabapentin (NEURONTIN) 600 mg tablet Take  by mouth two (2) times a day.  warfarin (COUMADIN) 4 mg tablet Take 1 Tab by mouth daily.  levothyroxine (SYNTHROID) 25 mcg tablet Take  by mouth Daily (before breakfast).  raNITIdine (ZANTAC) 150 mg tablet Take 150 mg by mouth daily.  tamsulosin (FLOMAX) 0.4 mg capsule Take 0.4 mg by mouth daily.  furosemide (LASIX) 40 mg tablet Take  by mouth daily.  diphenhydrAMINE (BENADRYL) 25 mg capsule Take 50 mg by mouth nightly as needed.  nicotinic acid (NIACIN) 500 mg tablet Take 500 mg by mouth every other day.  zinc 50 mg tab tablet Take 25 mg by mouth daily.     metoprolol succinate (TOPROL-XL) 25 mg XL tablet Take 1 Tab by mouth daily.  cyanocobalamin 1,000 mcg tablet Take 1,000 mcg by mouth daily.  loratadine (CLARITIN) 10 mg tablet Take 1 Tab by mouth daily. No current facility-administered medications for this visit. Health Maintenance   Topic Date Due    GLAUCOMA SCREENING Q2Y  08/28/2003    Pneumococcal 65+ High/Highest Risk (2 of 2 - PPSV23) 12/07/2016    MEDICARE YEARLY EXAM  02/08/2017    INFLUENZA AGE 9 TO ADULT  08/01/2017    DTaP/Tdap/Td series (2 - Td) 07/16/2024    ZOSTER VACCINE AGE 60>  Completed       There is no immunization history on file for this patient. No LMP for male patient. Allergies and Intolerances: Allergies   Allergen Reactions    Sulfa (Sulfonamide Antibiotics) Hives and Rash       Family History:   No family history on file. Social History:   He  reports that he has been smoking Cigars. He has never used smokeless tobacco.  He  reports that he drinks alcohol.             Review of Systems:   General: negative for - chills, fatigue, fever, weight change  Psych: negative for - anxiety, depression, irritability or mood swings  ENT: negative for - headaches, hearing change, nasal congestion, oral lesions, sneezing or sore throat  Heme/ Lymph: negative for - bleeding problems, bruising, pallor or swollen lymph nodes  Endo: negative for - hot flashes, polydipsia/polyuria or temperature intolerance  Resp: negative for - cough, shortness of breath or wheezing  CV: negative for - chest pain, edema or palpitations  GI: negative for - abdominal pain, change in bowel habits, constipation, diarrhea or nausea/vomiting  : negative for - dysuria, hematuria, incontinence, pelvic pain or vulvar/vaginal symptoms  MSK: negative for - joint pain, joint swelling or muscle pain  Neuro: negative for - confusion, headaches, seizures or weakness  Derm: negative for - dry skin, hair changes, rash or skin lesion changes, pos for leg swelling bilateral , superficial brusing          Physical:   Vitals:   Vitals:    07/31/17 1424   BP: 103/52   Pulse: 69   Resp: 18   Temp: 96.9 °F (36.1 °C)   TempSrc: Oral   Weight: 294 lb 6.4 oz (133.5 kg)   Height: 6' 5\" (1.956 m)           Exam:   HEENT- atraumatic,normocephalic, awake, oriented, well nourished  Neck - supple,no enlarged lymph nodes, no JVD, no thyromegaly  Chest- CTA, no rhonchi, no crackles  Heart- rrr, no murmurs / gallop/rub, not in afib  Abdomen- soft,BS+,NT, no hepatosplenomegaly  Ext - + 3 bilateral pitting edema, superficial ulcer , not infected, some oozing of serous fluid. Neuro- no focal deficits. Power 5/5 all extremities  Skin - warm,dry, no obvious rashes. Review of Data:   LABS:   Lab Results   Component Value Date/Time    WBC 4.9 07/12/2017 02:18 AM    HGB 12.4 07/12/2017 02:18 AM    HCT 37.9 07/12/2017 02:18 AM    PLATELET 704 66/01/9296 02:18 AM     Lab Results   Component Value Date/Time    Sodium 138 07/12/2017 02:18 AM    Potassium 4.5 07/12/2017 02:18 AM    Chloride 96 07/12/2017 02:18 AM    CO2 27 07/12/2017 02:18 AM    Glucose 140 07/12/2017 02:18 AM    BUN 20 07/12/2017 02:18 AM    Creatinine 0.82 07/12/2017 02:18 AM     Lab Results   Component Value Date/Time    Cholesterol, total 157 07/12/2017 02:18 AM    HDL Cholesterol 25 07/12/2017 02:18 AM    LDL, calculated 55 07/12/2017 02:18 AM    Triglyceride 387 07/12/2017 02:18 AM     No results found for: GPT        Impression / Plan:        ICD-10-CM ICD-9-CM    1. Coagulation disorder (HCC) D68.9 286.9 AMB POC PT/INR   2. CROWELL (dyspnea on exertion) R06.09 786.09 2D ECHO COMPLETE ADULT (TTE) W OR WO CONTR   3. Leg swelling M79.89 729.81 furosemide (LASIX) 40 mg tablet     increase lasix to 40 mg po bid. , swelling most likely  Due to neurontin    DM/HTn/Hypercol - stable      Par Afib - INR therapeutic. Explained to patient risk benefits of the medications. Advised patient to stop meds if having any side effects. Pt verbalized understanding of the instructions. I have discussed the diagnosis with the patient and the intended plan as seen in the above orders. The patient has received an after-visit summary and questions were answered concerning future plans. I have discussed medication side effects and warnings with the patient as well. I have reviewed the plan of care with the patient, accepted their input and they are in agreement with the treatment goals. Reviewed plan of care. Patient has provided input and agrees with goals.     Follow-up Disposition: Not on Lalit MD Skinny Unscheduled

## 2024-04-06 NOTE — PROGRESS NOTE ADULT - ASSESSMENT
A/P: 39yo POD#1 s/p pLTCS due to cat II c/b abruption. Patient is stable and doing well post-operatively.    - pt with giles, will d/c with protocol and follow up when pt voids  - Continue regular diet  - Increase ambulation  - Continue motrin, tylenol, oxycodone PRN for pain control.    - f/u AM CBC    Ariel Valentine MD PGY1 A/P: 39yo POD#1 s/p pLTCS due to cat II c/b abruption. Patient is stable and doing well post-operatively.    - pt with giles, will d/c with protocol and follow up when pt voids  - Continue regular diet  - Increase ambulation  - Continue motrin, tylenol, oxycodone PRN for pain control.    - f/u AM CBC    Ariel Valentine MD PGY1    Attending note   patient seen and examined   agree with assessment and plan of management   C Gilmer

## 2024-04-06 NOTE — PROGRESS NOTE ADULT - SUBJECTIVE AND OBJECTIVE BOX
ANESTHESIA POSTOP CHECK    38y Female POSTOP DAY 1 S/P       NO APPARENT ANESTHESIA COMPLICATIONS

## 2024-04-06 NOTE — OB NEONATOLOGY/PEDIATRICIAN DELIVERY SUMMARY - NSPEDSNEONOTESA_OBGYN_ALL_OB_FT
Baby is a 38.5 wk AGA female born to a 39 y/o  mother via emergency C/S for placental abruption. PEDS called for NICU resus for terminal bradycardia. Maternal history uncomplicated. Prenatal history uncomplicated. Maternal blood type O+. PNL negative, non-reactive, and immune. GBS negative on 3/22. SROM at 2:30 AM on 17 H 55 min, clear fluids. Warmed, dried, stimulated. Apgars 9/9. EOS _____. Mom plans to breastfeed and consents hepB. Highest maternal temp: ____.   BW: 2900   : 4/5  TOB: 20:25    Physical Exam:  Gen: NAD, +grimace  HEENT: anterior fontanel open soft and flat, ears normal set, no ear pits or tags. nares clinically patent  Resp: no increased work of breathing, good air entry b/l  Cardio: Normal S1/S2, regular rate and rhythm, no murmurs, rubs or gallops  Abd: soft, non tender, non distended, umbilical cord with 3 vessels  Neuro: normal tone  Extremities: moving all extremities, full range of motion x 4  Skin: pink, warm  Genitals: Dre 1, anus patent Baby is a 38.5 wk AGA female born to a 39 y/o  mother via emergency C/S for placental abruption. PEDS called for NICU resus for terminal bradycardia. Maternal history uncomplicated. Prenatal history uncomplicated. Maternal blood type O+. PNL negative, non-reactive, and immune. GBS negative on 3/22. SROM at 2:30 AM on 17 H 55 min, clear fluids. Warmed, dried, stimulated. Apgars 9/9. EOS 0.18. Mom plans to breastfeed and consents hepB. Highest maternal temp: 37.0.   BW: 2900   : 4/5  TOB: 20:25    Physical Exam:  Gen: NAD, +grimace  HEENT: anterior fontanel open soft and flat, ears normal set, no ear pits or tags. nares clinically patent  Resp: no increased work of breathing, good air entry b/l  Cardio: Normal S1/S2, regular rate and rhythm, no murmurs, rubs or gallops  Abd: soft, non tender, non distended, umbilical cord with 3 vessels  Neuro: normal tone  Extremities: moving all extremities, full range of motion x 4  Skin: pink, warm  Genitals: Dre 1, anus patent Baby is a 38.5 wk AGA female born to a 37 y/o  mother via emergency C/S for placental abruption. PEDS called for NICU resus for terminal bradycardia. Maternal history of hyperprolactinemia on carbegoline before pregnancy, MRI neg for pituitary adenoma, follows endo. TOPx1, no D+C. Maternal blood type O+. PNL negative, non-reactive, and immune. GBS negative on 3/22. SROM at 2:30 AM on 17 H 55 min, clear fluids. Warmed, dried, stimulated. Apgars 9/9. EOS 0.18. Mom plans to breastfeed and consents hepB. Highest maternal temp: 37.0.   BW: 2900   : 4/5  TOB: 20:25    Physical Exam:  Gen: NAD, +grimace  HEENT: anterior fontanel open soft and flat, ears normal set, no ear pits or tags. nares clinically patent  Resp: no increased work of breathing, good air entry b/l  Cardio: Normal S1/S2, regular rate and rhythm, no murmurs, rubs or gallops  Abd: soft, non tender, non distended, umbilical cord with 3 vessels  Neuro: normal tone  Extremities: moving all extremities, full range of motion x 4  Skin: pink, warm  Genitals: Dre 1, anus patent

## 2024-04-06 NOTE — PROVIDER CONTACT NOTE (OTHER) - RECOMMENDATIONS
CBC drawn, results pending. Consider IV fluid bolus & leaving in giles until pt. is asymptomatic. Continue to monitor for s/s of acute distress.

## 2024-04-06 NOTE — PROVIDER CONTACT NOTE (OTHER) - ASSESSMENT
Pt. endorses that she is asymptomatic when lying down but felt very dizzy upon standing. Lyin/58 HR: 56, Sittin/62 HR: 68, Standin/68 HR: 72. Urine output adequate. No other s/s of acute distress noted.

## 2024-04-07 RX ADMIN — Medication 975 MILLIGRAM(S): at 22:22

## 2024-04-07 RX ADMIN — Medication 600 MILLIGRAM(S): at 12:30

## 2024-04-07 RX ADMIN — Medication 600 MILLIGRAM(S): at 05:52

## 2024-04-07 RX ADMIN — Medication 600 MILLIGRAM(S): at 05:22

## 2024-04-07 RX ADMIN — HEPARIN SODIUM 5000 UNIT(S): 5000 INJECTION INTRAVENOUS; SUBCUTANEOUS at 05:21

## 2024-04-07 RX ADMIN — Medication 975 MILLIGRAM(S): at 15:30

## 2024-04-07 RX ADMIN — Medication 975 MILLIGRAM(S): at 09:12

## 2024-04-07 RX ADMIN — Medication 975 MILLIGRAM(S): at 10:00

## 2024-04-07 RX ADMIN — Medication 600 MILLIGRAM(S): at 17:28

## 2024-04-07 RX ADMIN — Medication 600 MILLIGRAM(S): at 18:00

## 2024-04-07 RX ADMIN — Medication 975 MILLIGRAM(S): at 14:52

## 2024-04-07 RX ADMIN — Medication 600 MILLIGRAM(S): at 11:51

## 2024-04-07 RX ADMIN — HEPARIN SODIUM 5000 UNIT(S): 5000 INJECTION INTRAVENOUS; SUBCUTANEOUS at 17:28

## 2024-04-07 RX ADMIN — Medication 975 MILLIGRAM(S): at 23:14

## 2024-04-07 NOTE — PROGRESS NOTE ADULT - ASSESSMENT
A/P: 39yo POD#2 s/p  pLTCS due to cat II c/b abruption.  Patient is stable and doing well post-operatively.    - Continue regular diet  - Increase ambulation  - Continue motrin, tylenol, oxycodone PRN for pain control.     Ariel Valentine MD PGY1

## 2024-04-07 NOTE — PROGRESS NOTE ADULT - SUBJECTIVE AND OBJECTIVE BOX
OB Progress Note: LTCS, POD#2    S: 37yo POD#2 s/p pLTCS due to cat II c/b abruption. Pain well controlled, tolerating regular diet, passing faltus, voiding spontaneously, ambulating without difficulty. Denies heavy vaginal bleeding, CP/SOB, lightheadedness/dizziness, nausea/vomiting,     O:  Vitals:  Vital Signs Last 24 Hrs  T(C): 36.7 (07 Apr 2024 06:16), Max: 37.2 (06 Apr 2024 09:38)  T(F): 98.1 (07 Apr 2024 06:16), Max: 98.9 (06 Apr 2024 09:38)  HR: 88 (07 Apr 2024 06:16) (78 - 88)  BP: 115/66 (07 Apr 2024 06:16) (106/65 - 115/66)  BP(mean): --  RR: 17 (07 Apr 2024 06:16) (17 - 18)  SpO2: 100% (07 Apr 2024 06:16) (98% - 100%)    Parameters below as of 07 Apr 2024 06:16  Patient On (Oxygen Delivery Method): room air        MEDICATIONS  (STANDING):  acetaminophen     Tablet .. 975 milliGRAM(s) Oral <User Schedule>  diphtheria/tetanus/pertussis (acellular) Vaccine (Adacel) 0.5 milliLiter(s) IntraMuscular once  heparin   Injectable 5000 Unit(s) SubCutaneous every 12 hours  ibuprofen  Tablet. 600 milliGRAM(s) Oral every 6 hours  lactated ringers. 1000 milliLiter(s) (75 mL/Hr) IV Continuous <Continuous>  lactated ringers. 1000 milliLiter(s) (125 mL/Hr) IV Continuous <Continuous>  morphine PF Epidural 2 milliGRAM(s) Epidural once  oxytocin Infusion 333.333 milliUNIT(s)/Min (1000 mL/Hr) IV Continuous <Continuous>      MEDICATIONS  (PRN):  butorphanol Injectable 0.125 milliGRAM(s) IV Push every 6 hours PRN Pruritus  diphenhydrAMINE 25 milliGRAM(s) Oral every 6 hours PRN Pruritus  lanolin Ointment 1 Application(s) Topical every 6 hours PRN Sore Nipples  magnesium hydroxide Suspension 30 milliLiter(s) Oral two times a day PRN Constipation  naloxone Injectable 0.1 milliGRAM(s) IV Push every 3 minutes PRN For ANY of the following changes in patient status:  A. RR LESS THAN 10 breaths per minute, B. Oxygen saturation LESS THAN 90%, C. Sedation score of 6  ondansetron Injectable 4 milliGRAM(s) IV Push every 6 hours PRN Nausea  oxyCODONE    IR 5 milliGRAM(s) Oral once PRN Moderate to Severe Pain (4-10)  oxyCODONE    IR 5 milliGRAM(s) Oral every 3 hours PRN Moderate to Severe Pain (4-10)  simethicone 80 milliGRAM(s) Chew every 4 hours PRN Gas      Labs:  Blood type: O Positive  Rubella IgG: RPR: Negative                          10.4<L>   20.71<H> >-----------< 210    ( 04-06 @ 08:39 )             32.3<L>                        12.5   9.28 >-----------< 248    ( 04-05 @ 07:55 )             39.0                  PE:  General: NAD  CV: RRR   Resp: CTAB  Abdomen: Soft, appropriately tender, Fundus firm incision c/d/i.  : Nl lochia  Extremities: No erythema, no pitting edema

## 2024-04-08 ENCOUNTER — TRANSCRIPTION ENCOUNTER (OUTPATIENT)
Age: 39
End: 2024-04-08

## 2024-04-08 VITALS
HEART RATE: 75 BPM | DIASTOLIC BLOOD PRESSURE: 70 MMHG | SYSTOLIC BLOOD PRESSURE: 111 MMHG | TEMPERATURE: 99 F | RESPIRATION RATE: 19 BRPM | OXYGEN SATURATION: 99 %

## 2024-04-08 LAB
HCT VFR BLD CALC: 30.1 % — LOW (ref 34.5–45)
HGB BLD-MCNC: 9.5 G/DL — LOW (ref 11.5–15.5)
MCHC RBC-ENTMCNC: 26.6 PG — LOW (ref 27–34)
MCHC RBC-ENTMCNC: 31.6 GM/DL — LOW (ref 32–36)
MCV RBC AUTO: 84.3 FL — SIGNIFICANT CHANGE UP (ref 80–100)
NRBC # BLD: 0 /100 WBCS — SIGNIFICANT CHANGE UP (ref 0–0)
NRBC # FLD: 0 K/UL — SIGNIFICANT CHANGE UP (ref 0–0)
PLATELET # BLD AUTO: 242 K/UL — SIGNIFICANT CHANGE UP (ref 150–400)
RBC # BLD: 3.57 M/UL — LOW (ref 3.8–5.2)
RBC # FLD: 14.5 % — SIGNIFICANT CHANGE UP (ref 10.3–14.5)
WBC # BLD: 13.39 K/UL — HIGH (ref 3.8–10.5)
WBC # FLD AUTO: 13.39 K/UL — HIGH (ref 3.8–10.5)

## 2024-04-08 RX ORDER — NORETHINDRONE 0.35 MG/1
1 TABLET ORAL
Qty: 90 | Refills: 0
Start: 2024-04-08 | End: 2024-07-06

## 2024-04-08 RX ORDER — ACETAMINOPHEN 500 MG
3 TABLET ORAL
Qty: 0 | Refills: 0 | DISCHARGE
Start: 2024-04-08

## 2024-04-08 RX ORDER — IBUPROFEN 200 MG
1 TABLET ORAL
Qty: 0 | Refills: 0 | DISCHARGE
Start: 2024-04-08

## 2024-04-08 RX ADMIN — Medication 600 MILLIGRAM(S): at 12:40

## 2024-04-08 RX ADMIN — HEPARIN SODIUM 5000 UNIT(S): 5000 INJECTION INTRAVENOUS; SUBCUTANEOUS at 05:46

## 2024-04-08 RX ADMIN — Medication 975 MILLIGRAM(S): at 09:40

## 2024-04-08 RX ADMIN — Medication 600 MILLIGRAM(S): at 01:42

## 2024-04-08 RX ADMIN — Medication 600 MILLIGRAM(S): at 01:12

## 2024-04-08 RX ADMIN — Medication 600 MILLIGRAM(S): at 05:35

## 2024-04-08 RX ADMIN — Medication 600 MILLIGRAM(S): at 06:01

## 2024-04-08 RX ADMIN — Medication 975 MILLIGRAM(S): at 09:10

## 2024-04-08 RX ADMIN — Medication 600 MILLIGRAM(S): at 12:10

## 2024-04-08 NOTE — DISCHARGE NOTE OB - MEDICATION SUMMARY - MEDICATIONS TO TAKE
I will START or STAY ON the medications listed below when I get home from the hospital:    ibuprofen 600 mg oral tablet  -- 1 tab(s) by mouth every 6 hours as needed for pain  -- Indication: For for pain    acetaminophen 325 mg oral tablet  -- 3 tab(s) by mouth every 6 hours as needed for pain  -- Indication: For for pain    Prenatal 1 oral capsule  -- 1 by mouth once a day  -- Indication: For postpartum care    Lelia 0.35 mg oral tablet  -- 1 tab(s) by mouth once a day for contraception  -- Indication: For contraception

## 2024-04-08 NOTE — DISCHARGE NOTE OB - PATIENT PORTAL LINK FT
You can access the FollowMyHealth Patient Portal offered by [facility] by registering at the following website: [email or web address]. By joining [email or web address]’s FollowMyHealth portal, you will also be able to view your health information using other applications (apps) compatible with our system.

## 2024-04-08 NOTE — DISCHARGE NOTE OB - NS MD DC FALL RISK RISK
For information on Fall & Injury Prevention, visit: [email or web address] OR  [email or web address] OR  https://www.cdc.gov/steadi/patient.html

## 2024-04-08 NOTE — DISCHARGE NOTE OB - PROVIDER TOKENS
FREE:[LAST:[[facility]],PHONE:[phone]],FAX:[(   [phone]    -],ADDRESS:[[address] [place] [address]],FOLLOWUP:[2 weeks]]

## 2024-04-08 NOTE — DISCHARGE NOTE OB - CARE PROVIDER_API CALL
[facility],   [address] [place] [address] [place] [address]  Phone: [phone]  Fax: (   )    -  Follow Up Time: 2 weeks

## 2024-04-08 NOTE — PROGRESS NOTE ADULT - ASSESSMENT
37y/o  POD#3 from pLTCS for category 2 tracing complicated by suspected placental abruption. H/H last 10.4/32.3 on . Patient is hemodynamically stable and progressing well post-op.    #Postpartum state  - Continue with po analgesia  - Increase ambulation  - Continue regular diet  - DVT prophylaxis with 5000u heparin  - Discharge planning     Gladys Moe PGY-1  39y/o  POD#3 from pLTCS for category 2 tracing complicated by placental abruption. H/H last 10.4/32.3 on . Patient is hemodynamically stable and progressing well post-op.    #Postpartum state  - Continue with po analgesia  - Increase ambulation  - Continue regular diet  - DVT prophylaxis with 5000u heparin  - Discharge planning     Gladys Moe PGY-1

## 2024-04-08 NOTE — PROGRESS NOTE ADULT - SUBJECTIVE AND OBJECTIVE BOX
R1 Progress Note    37yo POD#3 s/p pLTCS due to cat II c/b suspected abruption. Patient seen and examined at bedside, no acute overnight events. No acute complaints, pain well controlled. Patient is ambulating, voiding, and tolerating regular diet. Passing flatus. Denies CP, SOB, N/V, HA, blurred vision, epigastric pain.    Vital Signs Last 24 Hours  T(C): 36.7 (04-08-24 @ 05:59), Max: 36.9 (04-07-24 @ 13:42)  HR: 82 (04-08-24 @ 05:59) (82 - 98)  BP: 120/70 (04-08-24 @ 05:59) (118/72 - 123/73)  RR: 17 (04-08-24 @ 05:59) (17 - 18)  SpO2: 100% (04-08-24 @ 05:59) (100% - 100%)    I&O's Summary      Physical Exam:  General: NAD  Abdomen: Soft, non-tender, non-distended, fundus firm  Incision: Pfannenstiel incision CDI, subcuticular suture closure  Pelvic: Lochia wnl    Labs:    Blood Type: O Positive  Antibody Screen: Negative  RPR: Negative               10.4   20.71 )-----------( 210      ( 04-06 @ 08:39 )             32.3                12.5   9.28  )-----------( 248      ( 04-05 @ 07:55 )             39.0                13.2   8.98  )-----------( 256      ( 03-22 @ 11:20 )             40.9         MEDICATIONS  (STANDING):  acetaminophen     Tablet .. 975 milliGRAM(s) Oral <User Schedule>  diphtheria/tetanus/pertussis (acellular) Vaccine (Adacel) 0.5 milliLiter(s) IntraMuscular once  heparin   Injectable 5000 Unit(s) SubCutaneous every 12 hours  ibuprofen  Tablet. 600 milliGRAM(s) Oral every 6 hours  lactated ringers. 1000 milliLiter(s) (125 mL/Hr) IV Continuous <Continuous>  oxytocin Infusion 333.333 milliUNIT(s)/Min (1000 mL/Hr) IV Continuous <Continuous>    MEDICATIONS  (PRN):  diphenhydrAMINE 25 milliGRAM(s) Oral every 6 hours PRN Pruritus  lanolin Ointment 1 Application(s) Topical every 6 hours PRN Sore Nipples  magnesium hydroxide Suspension 30 milliLiter(s) Oral two times a day PRN Constipation  oxyCODONE    IR 5 milliGRAM(s) Oral once PRN Moderate to Severe Pain (4-10)  oxyCODONE    IR 5 milliGRAM(s) Oral every 3 hours PRN Moderate to Severe Pain (4-10)  simethicone 80 milliGRAM(s) Chew every 4 hours PRN Gas   R1 Progress Note    39yo POD#3 s/p pLTCS due to cat II c/b abruption. Patient seen and examined at bedside, no acute overnight events. No acute complaints, pain well controlled. Patient is ambulating, voiding, and tolerating regular diet. Passing flatus. Denies CP, SOB, N/V, HA, blurred vision, epigastric pain.    Vital Signs Last 24 Hours  T(C): 36.7 (04-08-24 @ 05:59), Max: 36.9 (04-07-24 @ 13:42)  HR: 82 (04-08-24 @ 05:59) (82 - 98)  BP: 120/70 (04-08-24 @ 05:59) (118/72 - 123/73)  RR: 17 (04-08-24 @ 05:59) (17 - 18)  SpO2: 100% (04-08-24 @ 05:59) (100% - 100%)    I&O's Summary      Physical Exam:  General: NAD  Abdomen: Soft, appropriately tender (dull 5/10 pain with pressure), non-distended, fundus firm  Incision: Pfannenstiel incision CDI, subcuticular suture closure  Pelvic: Lochia wnl    Labs:    Blood Type: O Positive  Antibody Screen: Negative  RPR: Negative               10.4   20.71 )-----------( 210      ( 04-06 @ 08:39 )             32.3                12.5   9.28  )-----------( 248      ( 04-05 @ 07:55 )             39.0                13.2   8.98  )-----------( 256      ( 03-22 @ 11:20 )             40.9         MEDICATIONS  (STANDING):  acetaminophen     Tablet .. 975 milliGRAM(s) Oral <User Schedule>  diphtheria/tetanus/pertussis (acellular) Vaccine (Adacel) 0.5 milliLiter(s) IntraMuscular once  heparin   Injectable 5000 Unit(s) SubCutaneous every 12 hours  ibuprofen  Tablet. 600 milliGRAM(s) Oral every 6 hours  lactated ringers. 1000 milliLiter(s) (125 mL/Hr) IV Continuous <Continuous>  oxytocin Infusion 333.333 milliUNIT(s)/Min (1000 mL/Hr) IV Continuous <Continuous>    MEDICATIONS  (PRN):  diphenhydrAMINE 25 milliGRAM(s) Oral every 6 hours PRN Pruritus  lanolin Ointment 1 Application(s) Topical every 6 hours PRN Sore Nipples  magnesium hydroxide Suspension 30 milliLiter(s) Oral two times a day PRN Constipation  oxyCODONE    IR 5 milliGRAM(s) Oral once PRN Moderate to Severe Pain (4-10)  oxyCODONE    IR 5 milliGRAM(s) Oral every 3 hours PRN Moderate to Severe Pain (4-10)  simethicone 80 milliGRAM(s) Chew every 4 hours PRN Gas

## 2024-04-08 NOTE — DISCHARGE NOTE OB - PLAN OF CARE
Make your follow-up appointment with your doctor as ordered. Call the clinic for your follow up appointment in 4-6 weeks. No heavy lifting, driving, or strenuous activity for 6 weeks. Nothing per vagina such as tampons, intercourse, douches or tub baths for 6 weeks or until you see your doctor. Call your doctor with any signs and symptoms of infection such as fever, chills, nausea or vomiting. Call your doctor if you’re unable to tolerate food, increase in vaginal bleeding, or have difficulty urinating. Call your doctor if you have pain that is not relieved by your prescribed medications. Notify your doctor with any other concerns. Call [phone] if you have any of these concerns in the next 6 weeks. Monitor for bleeding during the postpartum period. If significant bleeding occurs and are saturating more than 1 pad per hour, please go to emergency room for evaluation.

## 2024-04-08 NOTE — DISCHARGE NOTE OB - MATERIALS PROVIDED
Vaccinations/[facility]  Screening Program/  Immunization Record/Breastfeeding Log/Guide to Postpartum Care/[facility] Hearing Screen Program/Shaken Baby Prevention Handout/Breastfeeding Guide and Packet

## 2024-04-08 NOTE — DISCHARGE NOTE OB - HOSPITAL COURSE
Pt is a 39yo  at 38w5d who presented to triage for rule-out-rupture of membranes after gush of fluid at home. She was found to be ruptured and admitted for induction. Early in labor course, pt developed terminal fetal bradycardia and underwent emergency primary LTCS. During procedure, large blood clot noted upon entering uterus. Placental abruption diagnosis made at that time. EBL 855mL. Patient’s postoperative course was unremarkable and she remained hemodynamically stable and afebrile throughout. Upon discharge on POD3, the patient is ambulating and voiding spontaneously, tolerating oral intake, pain was well controlled with oral medication, and vital signs were stable.    Pt counseled to return to clinic for 2 week incision check and 6 week postpartum visit.

## 2024-04-08 NOTE — PROGRESS NOTE ADULT - ATTENDING COMMENTS
Pt seen and examined.  Pt with no complaints at time of evaluation.  Pt denies HA, CP, SOB, calf pain, fevers/chills.  Pt tolerating reg diet -N/-V, +Flatus.  Pt voiding and ambulating without difficulty.  Pt reports moderate lochia.      ICU Vital Signs Last 24 Hrs  T(C): 36.9 (07 Apr 2024 13:42), Max: 36.9 (07 Apr 2024 13:42)  T(F): 98.5 (07 Apr 2024 13:42), Max: 98.5 (07 Apr 2024 13:42)  HR: 98 (07 Apr 2024 13:42) (78 - 98)  BP: 123/73 (07 Apr 2024 13:42) (106/65 - 123/73)  BP(mean): --  ABP: --  ABP(mean): --  RR: 18 (07 Apr 2024 13:42) (17 - 18)  SpO2: 100% (07 Apr 2024 13:42) (100% - 100%)    O2 Parameters below as of 07 Apr 2024 06:16  Patient On (Oxygen Delivery Method): room air      General: NAD  Abd: fundus firm nontender  Incision: soft, C/D/I subcuticular closure with Prineo in place  Ext: no calf tenderness b/l       POD#2 sp stat PLTCS 2/2 cat 2 FHT, abruption.    1.  Abruption: hct 39-->32 doing well   2.  Continue routine postop and pp care  3.  Likely dc home tomorrow if no acute events overnight    Ellie Oliveira MD
Associate Chief of L & D ( late entry)     I have met this patient for the first time today. She was admitted by Dr Bell for PROM and delivered by Dr Tom for abruption     OB Progress Note:  Delivery, POD#3    S: 37yo POD#3 s/p LTCS . Patient denies any complaints at this time     O:   Vital Signs Last 24 Hrs  T(C): 36.3 (2024 09:26), Max: 36.9 (2024 13:42)  T(F): 97.3 (2024 09:26), Max: 98.5 (2024 13:42)  HR: 83 (:) (82 - 98)  BP: 123/75 (2024 09:26) (118/72 - 123/75)  RR: 18 (2024 09:26) (17 - 18)  SpO2: 98% (:) (98% - 100%)    Parameters below as of 2024 09:26  Patient On (Oxygen Delivery Method): room air        Labs:  Blood type: O Positive  Rubella IgG: RPR: Negative                          9.5<L>   13.39<H> >-----------< 242    ( 08 @ 10:08 )             30.1<L>                        10.4<L>   20.71<H> >-----------< 210    (  @ 08:39 )             32.3<L>      PE:    Abdomen: Mildly distended, appropriately tender  incision c/d/i.  Extremities: No erythema, trace edema    A/P: 37yo POD#3 s/p LTCS for CAT II PROM and abruption     -  Patient is stable for discharge and will follow up in the  clinic    Rebeka Mendoza M.D., M.B.A., M.S.

## 2024-04-08 NOTE — DISCHARGE NOTE OB - CARE PLAN
1 Principal Discharge DX:	Single delivery by  section  Assessment and plan of treatment:	Make your follow-up appointment with your doctor as ordered. Call the clinic for your follow up appointment in 4-6 weeks. No heavy lifting, driving, or strenuous activity for 6 weeks. Nothing per vagina such as tampons, intercourse, douches or tub baths for 6 weeks or until you see your doctor. Call your doctor with any signs and symptoms of infection such as fever, chills, nausea or vomiting. Call your doctor if you’re unable to tolerate food, increase in vaginal bleeding, or have difficulty urinating. Call your doctor if you have pain that is not relieved by your prescribed medications. Notify your doctor with any other concerns. Call [phone] if you have any of these concerns in the next 6 weeks.  Secondary Diagnosis:	Placental abruption  Assessment and plan of treatment:	Monitor for bleeding during the postpartum period. If significant bleeding occurs and are saturating more than 1 pad per hour, please go to emergency room for evaluation.

## 2024-04-11 PROBLEM — Z86.39 PERSONAL HISTORY OF OTHER ENDOCRINE, NUTRITIONAL AND METABOLIC DISEASE: Chronic | Status: ACTIVE | Noted: 2024-04-05

## 2024-04-12 ENCOUNTER — NON-APPOINTMENT (OUTPATIENT)
Age: 39
End: 2024-04-12

## 2024-04-17 ENCOUNTER — RESULT REVIEW (OUTPATIENT)
Age: 39
End: 2024-04-17

## 2024-04-17 ENCOUNTER — APPOINTMENT (OUTPATIENT)
Dept: OBGYN | Facility: HOSPITAL | Age: 39
End: 2024-04-17
Payer: MEDICAID

## 2024-04-17 ENCOUNTER — OUTPATIENT (OUTPATIENT)
Dept: OUTPATIENT SERVICES | Facility: HOSPITAL | Age: 39
LOS: 1 days | End: 2024-04-17

## 2024-04-17 ENCOUNTER — NON-APPOINTMENT (OUTPATIENT)
Age: 39
End: 2024-04-17

## 2024-04-17 VITALS
HEIGHT: 65 IN | BODY MASS INDEX: 25.99 KG/M2 | TEMPERATURE: 97.8 F | SYSTOLIC BLOOD PRESSURE: 107 MMHG | DIASTOLIC BLOOD PRESSURE: 80 MMHG | WEIGHT: 156 LBS | HEART RATE: 83 BPM

## 2024-04-17 DIAGNOSIS — Z34.83 ENCOUNTER FOR SUPERVISION OF OTHER NORMAL PREGNANCY, THIRD TRIMESTER: ICD-10-CM

## 2024-04-17 LAB
HCT VFR BLD CALC: 35.3 % — SIGNIFICANT CHANGE UP (ref 34.5–45)
HGB BLD-MCNC: 11 G/DL — LOW (ref 11.5–15.5)
MCHC RBC-ENTMCNC: 26.3 PG — LOW (ref 27–34)
MCHC RBC-ENTMCNC: 31.2 GM/DL — LOW (ref 32–36)
MCV RBC AUTO: 84.4 FL — SIGNIFICANT CHANGE UP (ref 80–100)
NRBC # BLD: 0 /100 WBCS — SIGNIFICANT CHANGE UP (ref 0–0)
NRBC # FLD: 0 K/UL — SIGNIFICANT CHANGE UP (ref 0–0)
PLATELET # BLD AUTO: 525 K/UL — HIGH (ref 150–400)
RBC # BLD: 4.18 M/UL — SIGNIFICANT CHANGE UP (ref 3.8–5.2)
RBC # FLD: 14.4 % — SIGNIFICANT CHANGE UP (ref 10.3–14.5)
WBC # BLD: 9.04 K/UL — SIGNIFICANT CHANGE UP (ref 3.8–10.5)
WBC # FLD AUTO: 9.04 K/UL — SIGNIFICANT CHANGE UP (ref 3.8–10.5)

## 2024-04-17 PROCEDURE — 99213 OFFICE O/P EST LOW 20 MIN: CPT

## 2024-04-17 NOTE — HISTORY OF PRESENT ILLNESS
[Postpartum Follow Up] : postpartum follow up [Last Pap Date: ___] : Last Pap Date: [unfilled] [Delivery Date: ___] : on [unfilled] [Primary C/S] : delivered by  section [Female] : Delivery History: baby girl [Wt. ___] : weighing [unfilled] [Breastfeeding] : currently nursing [Discharge HCT: ___] : hematocrit level was [unfilled] [Discharge HGB: ___] : hemoglobin level was [unfilled] [Intended Contraception] : Intended Contraception: [Oral Contraceptives] : oral contraceptives [None] : No associated symptoms are reported [Clean/Dry/Intact] : clean, dry and intact [Not Done] : Examination of breasts not done [Doing Well] : is doing well [No Sign of Infection] : is showing no signs of infection [Excellent Pain Control] : has excellent pain control [Rhogam] : Rhogam was not administered [Rubella Vaccine] : Rubella vaccine was not administered [Pertussis Vaccine] : Pertussis vaccine was not administered [BTL] : no tubal ligation [BF with Difficulty] : nursing without difficulty [Resumed Menses] : has not resumed her menses [Resumed [place]] : has not resumed intercourse [Erythema] : not erythematous [Swelling] : not swollen [Dehiscence] : not dehisced [FreeTextEntry8] : patient is here for a wound check s/p  @ 38w5d [FreeTextEntry9] : uncomplicated prenatal course [de-identified] : c/s for Category II FHRT [de-identified] : patient is doing well, happy with baby and has a good support system.  [de-identified] : continue PNV's while breastfeeding. C/s incision healing well, continue to use OTC analgesics as needed. she is taking isabel for contraception postpartum, but is aware to avoid intercourse until full postpartum visit. low h/h at time of discharge, repeat cbc today f/u results. RTC in 4 weeks for full pp visit, SW in to see patient today.

## 2024-04-18 DIAGNOSIS — Z51.89 ENCOUNTER FOR OTHER SPECIFIED AFTERCARE: ICD-10-CM

## 2024-04-18 DIAGNOSIS — Z98.891 HISTORY OF UTERINE SCAR FROM PREVIOUS SURGERY: ICD-10-CM

## 2024-04-18 LAB — SURGICAL PATHOLOGY STUDY: SIGNIFICANT CHANGE UP

## 2024-04-19 ENCOUNTER — APPOINTMENT (OUTPATIENT)
Dept: OBGYN | Facility: HOSPITAL | Age: 39
End: 2024-04-19

## 2024-04-29 ENCOUNTER — NON-APPOINTMENT (OUTPATIENT)
Age: 39
End: 2024-04-29

## 2024-05-10 ENCOUNTER — NON-APPOINTMENT (OUTPATIENT)
Age: 39
End: 2024-05-10

## 2024-05-16 ENCOUNTER — OUTPATIENT (OUTPATIENT)
Dept: OUTPATIENT SERVICES | Facility: HOSPITAL | Age: 39
LOS: 1 days | End: 2024-05-16

## 2024-05-16 ENCOUNTER — APPOINTMENT (OUTPATIENT)
Dept: OBGYN | Facility: HOSPITAL | Age: 39
End: 2024-05-16
Payer: MEDICAID

## 2024-05-16 VITALS
HEIGHT: 65 IN | DIASTOLIC BLOOD PRESSURE: 88 MMHG | TEMPERATURE: 98 F | WEIGHT: 158 LBS | HEART RATE: 66 BPM | BODY MASS INDEX: 26.33 KG/M2 | SYSTOLIC BLOOD PRESSURE: 114 MMHG

## 2024-05-16 DIAGNOSIS — Z87.19 PERSONAL HISTORY OF OTHER DISEASES OF THE DIGESTIVE SYSTEM: ICD-10-CM

## 2024-05-16 DIAGNOSIS — Z98.891 HISTORY OF UTERINE SCAR FROM PREVIOUS SURGERY: ICD-10-CM

## 2024-05-16 DIAGNOSIS — Z51.89 ENCOUNTER FOR OTHER SPECIFIED AFTERCARE: ICD-10-CM

## 2024-05-16 DIAGNOSIS — Z30.09 ENCOUNTER FOR OTHER GENERAL COUNSELING AND ADVICE ON CONTRACEPTION: ICD-10-CM

## 2024-05-16 DIAGNOSIS — Z86.2 PERSONAL HISTORY OF DISEASES OF THE BLOOD AND BLOOD-FORMING ORGANS AND CERTAIN DISORDERS INVOLVING THE IMMUNE MECHANISM: ICD-10-CM

## 2024-05-16 DIAGNOSIS — E55.9 VITAMIN D DEFICIENCY, UNSPECIFIED: ICD-10-CM

## 2024-05-16 PROCEDURE — 99213 OFFICE O/P EST LOW 20 MIN: CPT

## 2024-05-16 RX ORDER — NORETHINDRONE 0.35 MG/1
0.35 TABLET ORAL DAILY
Qty: 1 | Refills: 5 | Status: ACTIVE | COMMUNITY
Start: 2024-05-16 | End: 1900-01-01

## 2024-05-20 DIAGNOSIS — Z30.09 ENCOUNTER FOR OTHER GENERAL COUNSELING AND ADVICE ON CONTRACEPTION: ICD-10-CM

## 2024-05-20 NOTE — HISTORY OF PRESENT ILLNESS
[Last Pap Date: ___] : Last Pap Date: [unfilled] [Delivery Date: ___] : on [unfilled] [Primary C/S] : delivered by  section [Female] : Delivery History: baby girl [Wt. ___] : weighing [unfilled] [Discharge HCT: ___] : hematocrit level was [unfilled] [Discharge HGB: ___] : hemoglobin level was [unfilled] [Intended Contraception] : Intended Contraception: [Oral Contraceptives] : oral contraceptives [Clean/Dry/Intact] : clean, dry and intact [Back to Normal] : is back to normal in size [None] : no vaginal bleeding [Normal] : the vagina was normal [Examination Of The Breasts] : breasts are normal [Doing Well] : is doing well [Rhogam] : Rhogam was not administered [Rubella Vaccine] : Rubella vaccine was not administered [Pertussis Vaccine] : Pertussis vaccine was not administered [BTL] : no tubal ligation [Breastfeeding] : not currently nursing [Resumed Menses] : has not resumed her menses [Resumed [place]] : has not resumed intercourse [Cervix Sample Taken] : cervical sample not taken for a Pap smear [FreeTextEntry8] : s/p c/s @38w5d 2/2 [facility]  [FreeTextEntry9] : uncomplicated  [de-identified] : Mother and baby doing well with no c/o. Plans to resume intercourse after 3 month PP. No menses since delivery. BF minimally at night. [de-identified] : To start POP with at start of next period or at 8 weeks PP quick start. CBC done at wound check improved since deliver 11/35.3. Refills sent for POP. RTC 6 months.

## 2024-06-07 ENCOUNTER — NON-APPOINTMENT (OUTPATIENT)
Age: 39
End: 2024-06-07

## 2024-07-31 ENCOUNTER — APPOINTMENT (OUTPATIENT)
Dept: OBGYN | Facility: CLINIC | Age: 39
End: 2024-07-31

## 2024-12-12 ENCOUNTER — APPOINTMENT (OUTPATIENT)
Dept: OBGYN | Facility: HOSPITAL | Age: 39
End: 2024-12-12

## 2025-01-07 ENCOUNTER — RESULT REVIEW (OUTPATIENT)
Age: 40
End: 2025-01-07

## 2025-01-07 ENCOUNTER — APPOINTMENT (OUTPATIENT)
Dept: OBGYN | Facility: HOSPITAL | Age: 40
End: 2025-01-07
Payer: MEDICAID

## 2025-01-07 ENCOUNTER — OUTPATIENT (OUTPATIENT)
Dept: OUTPATIENT SERVICES | Facility: HOSPITAL | Age: 40
LOS: 1 days | End: 2025-01-07

## 2025-01-07 VITALS
BODY MASS INDEX: 29.99 KG/M2 | SYSTOLIC BLOOD PRESSURE: 124 MMHG | WEIGHT: 180 LBS | TEMPERATURE: 97.5 F | DIASTOLIC BLOOD PRESSURE: 85 MMHG | HEART RATE: 81 BPM | HEIGHT: 65 IN

## 2025-01-07 DIAGNOSIS — Z87.59 PERSONAL HISTORY OF OTHER COMPLICATIONS OF PREGNANCY, CHILDBIRTH AND THE PUERPERIUM: ICD-10-CM

## 2025-01-07 DIAGNOSIS — Z87.19 PERSONAL HISTORY OF OTHER DISEASES OF THE DIGESTIVE SYSTEM: ICD-10-CM

## 2025-01-07 DIAGNOSIS — Z01.419 ENCOUNTER FOR GYNECOLOGICAL EXAMINATION (GENERAL) (ROUTINE) W/OUT ABNORMAL FINDINGS: ICD-10-CM

## 2025-01-07 DIAGNOSIS — E22.1 HYPERPROLACTINEMIA: ICD-10-CM

## 2025-01-07 LAB
A1C WITH ESTIMATED AVERAGE GLUCOSE RESULT: 5.8 % — HIGH (ref 4–5.6)
ANION GAP SERPL CALC-SCNC: 13 MMOL/L — SIGNIFICANT CHANGE UP (ref 7–14)
BUN SERPL-MCNC: 8 MG/DL — SIGNIFICANT CHANGE UP (ref 7–23)
CALCIUM SERPL-MCNC: 9.4 MG/DL — SIGNIFICANT CHANGE UP (ref 8.4–10.5)
CHLORIDE SERPL-SCNC: 105 MMOL/L — SIGNIFICANT CHANGE UP (ref 98–107)
CO2 SERPL-SCNC: 23 MMOL/L — SIGNIFICANT CHANGE UP (ref 22–31)
CREAT SERPL-MCNC: 0.67 MG/DL — SIGNIFICANT CHANGE UP (ref 0.5–1.3)
EGFR: 115 ML/MIN/1.73M2 — SIGNIFICANT CHANGE UP
ESTIMATED AVERAGE GLUCOSE: 120 — SIGNIFICANT CHANGE UP
GLUCOSE SERPL-MCNC: 89 MG/DL — SIGNIFICANT CHANGE UP (ref 70–99)
HCT VFR BLD CALC: 41.5 % — SIGNIFICANT CHANGE UP (ref 34.5–45)
HGB BLD-MCNC: 12.7 G/DL — SIGNIFICANT CHANGE UP (ref 11.5–15.5)
HIV 1+2 AB+HIV1 P24 AG SERPL QL IA: SIGNIFICANT CHANGE UP
MCHC RBC-ENTMCNC: 25.8 PG — LOW (ref 27–34)
MCHC RBC-ENTMCNC: 30.6 G/DL — LOW (ref 32–36)
MCV RBC AUTO: 84.2 FL — SIGNIFICANT CHANGE UP (ref 80–100)
NRBC # BLD: 0 /100 WBCS — SIGNIFICANT CHANGE UP (ref 0–0)
NRBC # FLD: 0 K/UL — SIGNIFICANT CHANGE UP (ref 0–0)
PLATELET # BLD AUTO: 358 K/UL — SIGNIFICANT CHANGE UP (ref 150–400)
POTASSIUM SERPL-MCNC: 3.6 MMOL/L — SIGNIFICANT CHANGE UP (ref 3.5–5.3)
POTASSIUM SERPL-SCNC: 3.6 MMOL/L — SIGNIFICANT CHANGE UP (ref 3.5–5.3)
RBC # BLD: 4.93 M/UL — SIGNIFICANT CHANGE UP (ref 3.8–5.2)
RBC # FLD: 13.6 % — SIGNIFICANT CHANGE UP (ref 10.3–14.5)
SODIUM SERPL-SCNC: 141 MMOL/L — SIGNIFICANT CHANGE UP (ref 135–145)
TSH SERPL-MCNC: 2.15 UIU/ML — SIGNIFICANT CHANGE UP (ref 0.27–4.2)
WBC # BLD: 7.52 K/UL — SIGNIFICANT CHANGE UP (ref 3.8–10.5)
WBC # FLD AUTO: 7.52 K/UL — SIGNIFICANT CHANGE UP (ref 3.8–10.5)

## 2025-01-07 PROCEDURE — 99395 PREV VISIT EST AGE 18-39: CPT

## 2025-01-08 DIAGNOSIS — Z01.419 ENCOUNTER FOR GYNECOLOGICAL EXAMINATION (GENERAL) (ROUTINE) WITHOUT ABNORMAL FINDINGS: ICD-10-CM

## 2025-01-08 DIAGNOSIS — E22.1 HYPERPROLACTINEMIA: ICD-10-CM

## 2025-01-08 LAB
24R-OH-CALCIDIOL SERPL-MCNC: 20.2 NG/ML — LOW (ref 30–80)
C TRACH RRNA SPEC QL NAA+PROBE: SIGNIFICANT CHANGE UP
N GONORRHOEA RRNA SPEC QL NAA+PROBE: SIGNIFICANT CHANGE UP
PROLACTIN SERPL-MCNC: 152 NG/ML — HIGH (ref 3.4–24.1)
SPECIMEN SOURCE: SIGNIFICANT CHANGE UP
T PALLIDUM AB TITR SER: NEGATIVE — SIGNIFICANT CHANGE UP

## 2025-01-09 LAB
HBV SURFACE AG SER-ACNC: SIGNIFICANT CHANGE UP
HCV AB S/CO SERPL IA: 0.42 S/CO — SIGNIFICANT CHANGE UP (ref 0–0.99)
HCV AB SERPL-IMP: SIGNIFICANT CHANGE UP

## 2025-01-22 ENCOUNTER — APPOINTMENT (OUTPATIENT)
Dept: ENDOCRINOLOGY | Facility: CLINIC | Age: 40
End: 2025-01-22
Payer: MEDICAID

## 2025-01-22 VITALS
DIASTOLIC BLOOD PRESSURE: 71 MMHG | HEART RATE: 77 BPM | SYSTOLIC BLOOD PRESSURE: 113 MMHG | HEIGHT: 65 IN | OXYGEN SATURATION: 97 % | WEIGHT: 180 LBS | BODY MASS INDEX: 29.99 KG/M2 | TEMPERATURE: 97.7 F

## 2025-01-22 DIAGNOSIS — E22.1 HYPERPROLACTINEMIA: ICD-10-CM

## 2025-01-22 DIAGNOSIS — D35.2 BENIGN NEOPLASM OF PITUITARY GLAND: ICD-10-CM

## 2025-01-22 PROCEDURE — 99214 OFFICE O/P EST MOD 30 MIN: CPT

## 2025-01-22 RX ORDER — CABERGOLINE 0.5 MG/1
0.5 TABLET ORAL
Qty: 4 | Refills: 5 | Status: ACTIVE | COMMUNITY
Start: 2025-01-22 | End: 1900-01-01

## 2025-02-20 ENCOUNTER — OUTPATIENT (OUTPATIENT)
Dept: OUTPATIENT SERVICES | Facility: HOSPITAL | Age: 40
LOS: 1 days | End: 2025-02-20

## 2025-02-20 ENCOUNTER — APPOINTMENT (OUTPATIENT)
Dept: MRI IMAGING | Facility: CLINIC | Age: 40
End: 2025-02-20
Payer: MEDICAID

## 2025-02-20 PROCEDURE — 70553 MRI BRAIN STEM W/O & W/DYE: CPT | Mod: 26

## 2025-04-16 ENCOUNTER — APPOINTMENT (OUTPATIENT)
Dept: ENDOCRINOLOGY | Facility: CLINIC | Age: 40
End: 2025-04-16

## (undated) DEVICE — DRSG DERMABOND PRINEO 22CM